# Patient Record
Sex: MALE | Race: WHITE | NOT HISPANIC OR LATINO | URBAN - METROPOLITAN AREA
[De-identification: names, ages, dates, MRNs, and addresses within clinical notes are randomized per-mention and may not be internally consistent; named-entity substitution may affect disease eponyms.]

---

## 2017-12-04 ENCOUNTER — COMMUNICATION - HEALTHEAST (OUTPATIENT)
Dept: INTERNAL MEDICINE | Facility: CLINIC | Age: 37
End: 2017-12-04

## 2017-12-05 ENCOUNTER — OFFICE VISIT - HEALTHEAST (OUTPATIENT)
Dept: INTERNAL MEDICINE | Facility: CLINIC | Age: 37
End: 2017-12-05

## 2017-12-05 DIAGNOSIS — R31.29 MICROSCOPIC HEMATURIA: ICD-10-CM

## 2017-12-05 DIAGNOSIS — Z00.00 ROUTINE GENERAL MEDICAL EXAMINATION AT A HEALTH CARE FACILITY: ICD-10-CM

## 2017-12-05 LAB
CHOLEST SERPL-MCNC: 264 MG/DL
FASTING STATUS PATIENT QL REPORTED: YES
HDLC SERPL-MCNC: 118 MG/DL
LDLC SERPL CALC-MCNC: 138 MG/DL
TRIGL SERPL-MCNC: 40 MG/DL

## 2017-12-05 ASSESSMENT — MIFFLIN-ST. JEOR: SCORE: 1593.04

## 2017-12-06 ENCOUNTER — COMMUNICATION - HEALTHEAST (OUTPATIENT)
Dept: INTERNAL MEDICINE | Facility: CLINIC | Age: 37
End: 2017-12-06

## 2018-04-25 ENCOUNTER — COMMUNICATION - HEALTHEAST (OUTPATIENT)
Dept: INTERNAL MEDICINE | Facility: CLINIC | Age: 38
End: 2018-04-25

## 2018-04-25 RX ORDER — GLYCOPYRROLATE 1 MG/1
TABLET ORAL
Qty: 180 TABLET | Refills: 1 | Status: SHIPPED | OUTPATIENT
Start: 2018-04-25 | End: 2021-07-14

## 2019-10-15 ENCOUNTER — OFFICE VISIT - HEALTHEAST (OUTPATIENT)
Dept: INTERNAL MEDICINE | Facility: CLINIC | Age: 39
End: 2019-10-15

## 2019-10-15 DIAGNOSIS — Z23 NEED FOR PROPHYLACTIC VACCINATION WITH TETANUS-DIPHTHERIA (TD): ICD-10-CM

## 2019-10-15 DIAGNOSIS — Z00.00 ROUTINE GENERAL MEDICAL EXAMINATION AT A HEALTH CARE FACILITY: ICD-10-CM

## 2019-10-15 DIAGNOSIS — R31.29 MICROSCOPIC HEMATURIA: ICD-10-CM

## 2019-10-15 DIAGNOSIS — Z23 IMMUNIZATION DUE: ICD-10-CM

## 2019-10-15 LAB
ALBUMIN SERPL-MCNC: 4.1 G/DL (ref 3.5–5)
ALBUMIN UR-MCNC: ABNORMAL MG/DL
ALP SERPL-CCNC: 64 U/L (ref 45–120)
ALT SERPL W P-5'-P-CCNC: 62 U/L (ref 0–45)
ANION GAP SERPL CALCULATED.3IONS-SCNC: 8 MMOL/L (ref 5–18)
APPEARANCE UR: CLEAR
AST SERPL W P-5'-P-CCNC: 49 U/L (ref 0–40)
BACTERIA #/AREA URNS HPF: ABNORMAL HPF
BILIRUB SERPL-MCNC: 0.6 MG/DL (ref 0–1)
BILIRUB UR QL STRIP: NEGATIVE
BUN SERPL-MCNC: 10 MG/DL (ref 8–22)
CALCIUM SERPL-MCNC: 9.7 MG/DL (ref 8.5–10.5)
CHLORIDE BLD-SCNC: 103 MMOL/L (ref 98–107)
CHOLEST SERPL-MCNC: 240 MG/DL
CO2 SERPL-SCNC: 27 MMOL/L (ref 22–31)
COLOR UR AUTO: YELLOW
CREAT SERPL-MCNC: 0.73 MG/DL (ref 0.7–1.3)
ERYTHROCYTE [DISTWIDTH] IN BLOOD BY AUTOMATED COUNT: 12.1 % (ref 11–14.5)
FASTING STATUS PATIENT QL REPORTED: YES
GFR SERPL CREATININE-BSD FRML MDRD: >60 ML/MIN/1.73M2
GLUCOSE BLD-MCNC: 96 MG/DL (ref 70–125)
GLUCOSE UR STRIP-MCNC: NEGATIVE MG/DL
HCT VFR BLD AUTO: 43.2 % (ref 40–54)
HDLC SERPL-MCNC: 116 MG/DL
HGB BLD-MCNC: 14.7 G/DL (ref 14–18)
HGB UR QL STRIP: ABNORMAL
KETONES UR STRIP-MCNC: NEGATIVE MG/DL
LDLC SERPL CALC-MCNC: 115 MG/DL
LEUKOCYTE ESTERASE UR QL STRIP: NEGATIVE
MCH RBC QN AUTO: 31.7 PG (ref 27–34)
MCHC RBC AUTO-ENTMCNC: 34 G/DL (ref 32–36)
MCV RBC AUTO: 93 FL (ref 80–100)
MUCOUS THREADS #/AREA URNS LPF: ABNORMAL LPF
NITRATE UR QL: NEGATIVE
PH UR STRIP: 6 [PH] (ref 5–8)
PLATELET # BLD AUTO: 275 THOU/UL (ref 140–440)
PMV BLD AUTO: 8.5 FL (ref 7–10)
POTASSIUM BLD-SCNC: 4.3 MMOL/L (ref 3.5–5)
PROT SERPL-MCNC: 7.1 G/DL (ref 6–8)
RBC # BLD AUTO: 4.62 MILL/UL (ref 4.4–6.2)
RBC #/AREA URNS AUTO: ABNORMAL HPF
SODIUM SERPL-SCNC: 138 MMOL/L (ref 136–145)
SP GR UR STRIP: <=1.005 (ref 1–1.03)
SQUAMOUS #/AREA URNS AUTO: ABNORMAL LPF
TRIGL SERPL-MCNC: 46 MG/DL
UROBILINOGEN UR STRIP-ACNC: ABNORMAL
WBC #/AREA URNS AUTO: ABNORMAL HPF
WBC: 7.5 THOU/UL (ref 4–11)

## 2019-10-15 ASSESSMENT — MIFFLIN-ST. JEOR: SCORE: 1574.9

## 2019-10-22 ENCOUNTER — COMMUNICATION - HEALTHEAST (OUTPATIENT)
Dept: INTERNAL MEDICINE | Facility: CLINIC | Age: 39
End: 2019-10-22

## 2020-10-07 ENCOUNTER — COMMUNICATION - HEALTHEAST (OUTPATIENT)
Dept: INTERNAL MEDICINE | Facility: CLINIC | Age: 40
End: 2020-10-07

## 2020-11-16 ENCOUNTER — HEALTH MAINTENANCE LETTER (OUTPATIENT)
Age: 40
End: 2020-11-16

## 2021-05-31 VITALS — BODY MASS INDEX: 23.34 KG/M2 | HEIGHT: 68 IN | WEIGHT: 154 LBS

## 2021-06-02 NOTE — PROGRESS NOTES
Office Visit - Physical   Jean-Claude Bose   39 y.o.  male    Date of visit: 10/15/2019  Physician: Luis F Montoya MD     Assessment and Plan   1. Routine general medical examination at a health care facility  He lives a active healthy lifestyle.  Labs as below.  He has had his flu shot.  Tetanus booster today.  He has had STI testing for his surrogacy.  - Lipid Cascade  - Comprehensive Metabolic Panel  - Urinalysis-UC if Indicated  - HM2(CBC w/o Differential)    2. Microscopic hematuria  This is been persistent for years since his childhood.  Intensively worked up in the past.  Recheck urinalysis today.  He declines need for further testing.        Return in about 1 year (around 10/15/2020) for Annual physical.     Chief Complaint   Chief Complaint   Patient presents with     Annual Exam     fasting today         Patient Profile   Social History     Patient does not qualify to have social determinant information on file (likely too young).   Social History Narrative     Not on file        Past Medical History   Patient Active Problem List   Diagnosis     Urine Tests Nonspecific Abnormal Findings     Functional Murmur     Microscopic Hematuria       Past Surgical History  He has no past surgical history on file.     History of Present Illness   This 39 y.o. old Jean-Claude comes in today for physical.  He reports been doing very well.  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  Very busy.  He just ran the [employer] and qualified for [employer] last year so will be running [employer].  They have a [id number] named Guerrero.  They are going to have another surrogacy.  Things are going very well for him.  He and  are monogamous.  Parents are doing well.  Father's arthritis is severe.  Sister got .  No other new family history.    Review of Systems: A comprehensive review of systems was negative except as noted.     Medications and Allergies   Current Outpatient Medications   Medication  "Sig Dispense Refill     glycopyrrolate (ROBINUL) 1 mg tablet Take one tablet up to two times daily. 180 tablet 1     No current facility-administered medications for this visit.      Allergies   Allergen Reactions     Sulfa (Sulfonamide Antibiotics)         Family and Social History   No family history on file.     Social History     Tobacco Use     Smoking status: Never Smoker     Smokeless tobacco: Never Used   Substance Use Topics     Alcohol use: Not on file     Drug use: Not on file        Physical Exam   General Appearance:   Pleasant gentleman in no distress.  Looks well.    /84 (Patient Site: Right Arm, Patient Position: Sitting, Cuff Size: Adult Regular)   Pulse 64   Ht 5' 8\" (1.727 m)   Wt 150 lb (68 kg)   SpO2 98%   BMI 22.81 kg/m      EYES: Eyelids, conjunctiva, and sclera were normal. Pupils were normal. Cornea, iris, and lens were normal bilaterally.  HEAD, EARS, NOSE, MOUTH, AND THROAT: Head and face were normal. Hearing was normal to voice and the ears were normal to external exam. Nose appearance was normal and there was no discharge. Oropharynx was normal.  NECK: Neck appearance was normal. There were no neck masses and the thyroid was not enlarged.  RESPIRATORY: Breathing pattern was normal and the chest moved symmetrically.  Percussion/auscultatory percussion was normal.  Lung sounds were normal and there were no abnormal sounds.  CARDIOVASCULAR: Heart rate and rhythm were normal.  S1 and S2 were normal and there were no extra sounds or murmurs. Peripheral pulses in arms and legs were normal.  Jugular venous pressure was normal.  There was no peripheral edema.  GASTROINTESTINAL: The abdomen was normal in contour.  Bowel sounds were present.  Percussion detected no organ enlargement or tenderness.  Palpation detected no tenderness, mass, or enlarged organs.   MUSCULOSKELETAL: Skeletal configuration was normal and muscle mass was normal for age. Joint appearance was overall " normal.  LYMPHATIC: There were no enlarged nodes.  SKIN/HAIR/NAILS: Skin color was normal.  There were no skin lesions.  Hair and nails were normal.  NEUROLOGIC: The patient was alert and oriented to person, place, time, and circumstance. Speech was normal. Cranial nerves were normal. Motor strength was normal for age. The patient was normally coordinated.  PSYCHIATRIC:  Mood and affect were normal and the patient had normal recent and remote memory. The patient's judgment and insight were normal.    ADDITIONAL VITAL SIGNS: None  CHEST WALL/BREASTS: Normal  RECTAL: Deferred  GENITAL/URINARY: Normal     Additional Information        Luis F Montoya MD  Internal Medicine  Contact me at [phone]

## 2021-06-03 VITALS
OXYGEN SATURATION: 98 % | WEIGHT: 150 LBS | BODY MASS INDEX: 22.73 KG/M2 | HEART RATE: 64 BPM | DIASTOLIC BLOOD PRESSURE: 84 MMHG | SYSTOLIC BLOOD PRESSURE: 120 MMHG | HEIGHT: 68 IN

## 2021-06-14 NOTE — PROGRESS NOTES
Office Visit - Physical   Jean-Claude Bose   37 y.o.  male    Date of visit: 12/5/2017  Physician: Luis F Montoya MD     Assessment and Plan   1. Routine general medical examination at a health care facility  Lives a very healthy lifestyle.  We discussed overuse injuries.  He will be due for tetanus next year.  Labs as below.  He declines STD testing.  - Comprehensive Metabolic Panel  - Lipid Cascade  - HM2(CBC w/o Differential)  - Urinalysis-UC if Indicated  - Vitamin D, Total (25-Hydroxy)    2. Microscopic hematuria  He has had this microscopic hematuria since a child.  Evaluations have been negative in the past.  Urology did want him to follow-up but he has not done such.  He will consider that this year.  - HM2(CBC w/o Differential)  - Urinalysis-UC if Indicated        Return in about 1 year (around 12/5/2018) for Annual physical.     Chief Complaint   Chief Complaint   Patient presents with     Annual Exam     fasting        Patient Profile   Social History     Social History Narrative        Past Medical History   Patient Active Problem List   Diagnosis     Urine Tests Nonspecific Abnormal Findings     Functional Murmur     Microscopic Hematuria       Past Surgical History  He has no past surgical history on file.     History of Present Illness   This 37 y.o. 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  He reports his been feeling well.  He did have a stress fracture of his tibia last year but that has healed and he is back to doing his triathlons.  Trains twice a day during the summer months.  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  Is going well.  Plan for a child via surrogate next year.  Family is doing well.  Parents are alive and well.  Father does have significant DJD that has him fairly impaired with mobility at only the age of 70.  Otherwise health is good.  Social drinker.  Eats healthy.  Non-smoker.  No illicit drugs.      Review of Systems: A  "comprehensive review of systems was negative except as noted.     Medications and Allergies   Current Outpatient Prescriptions   Medication Sig Dispense Refill     glycopyrrolate (ROBINUL) 1 mg tablet Take one tablet up to two times daily. (Patient taking differently: Take one tablet prn for excessive sweating) 180 tablet 1     No current facility-administered medications for this visit.      Allergies   Allergen Reactions     Sulfa (Sulfonamide Antibiotics)         Family and Social History   No family history on file.     Social History   Substance Use Topics     Smoking status: Never Smoker     Smokeless tobacco: None     Alcohol use None        Physical Exam   General Appearance:   Thin pleasant young gentleman in no distress.    /80 (Patient Site: Right Arm, Patient Position: Sitting, Cuff Size: Adult Regular)  Pulse 60  Ht 5' 8\" (1.727 m)  Wt 154 lb (69.9 kg)  SpO2 98%  BMI 23.42 kg/m2    EYES: Eyelids, conjunctiva, and sclera were normal. Pupils were normal. Cornea, iris, and lens were normal bilaterally.  HEAD, EARS, NOSE, MOUTH, AND THROAT: Head and face were normal. Hearing was normal to voice and the ears were normal to external exam. Nose appearance was normal and there was no discharge. Oropharynx was normal.  NECK: Neck appearance was normal. There were no neck masses and the thyroid was not enlarged.  RESPIRATORY: Breathing pattern was normal and the chest moved symmetrically.  Percussion/auscultatory percussion was normal.  Lung sounds were normal and there were no abnormal sounds.  CARDIOVASCULAR: Heart rate and rhythm were normal.  S1 and S2 were normal and there were no extra sounds or murmurs. Peripheral pulses in arms and legs were normal.  Jugular venous pressure was normal.  There was no peripheral edema.  GASTROINTESTINAL: The abdomen was normal in contour.  Bowel sounds were present.  Percussion detected no organ enlargement or tenderness.  Palpation detected no tenderness, mass, " or enlarged organs.   MUSCULOSKELETAL: Skeletal configuration was normal and muscle mass was normal for age. Joint appearance was overall normal.  LYMPHATIC: There were no enlarged nodes.  SKIN/HAIR/NAILS: Skin color was normal.  There were no skin lesions.  Hair and nails were normal.  NEUROLOGIC: The patient was alert and oriented to person, place, time, and circumstance. Speech was normal. Cranial nerves were normal. Motor strength was normal for age. The patient was normally coordinated.  PSYCHIATRIC:  Mood and affect were normal and the patient had normal recent and remote memory. The patient's judgment and insight were normal.    ADDITIONAL VITAL SIGNS: none  CHEST WALL/BREASTS: nml    RECTAL: def  GENITAL/URINARY: nml     Additional Information        Luis F Montoya MD  Internal Medicine  Contact me at [phone]

## 2021-06-19 NOTE — LETTER
Letter by Luis F Montoya MD at      Author: Luis F Montoya MD Service: -- Author Type: --    Filed:  Encounter Date: 10/22/2019 Status: Signed         Jean-Claude Bose  [address]  [facility] [address]             October 22, 2019         Dear Mr. Bose,    Below are the results from your recent visit:    Resulted Orders   Lipid Cascade   Result Value Ref Range    Cholesterol 240 (H) <=199 mg/dL    Triglycerides 46 <=149 mg/dL    HDL Cholesterol 116 >=40 mg/dL    LDL Calculated 115 <=129 mg/dL    Patient Fasting > 8hrs? Yes    Comprehensive Metabolic Panel   Result Value Ref Range    Sodium 138 136 - 145 mmol/L    Potassium 4.3 3.5 - 5.0 mmol/L    Chloride 103 98 - 107 mmol/L    CO2 27 22 - 31 mmol/L    Anion Gap, Calculation 8 5 - 18 mmol/L    Glucose 96 70 - 125 mg/dL    BUN 10 8 - 22 mg/dL    Creatinine 0.73 0.70 - 1.30 mg/dL    GFR MDRD Af Amer >60 >60 mL/min/1.73m2    GFR MDRD Non Af Amer >60 >60 mL/min/1.73m2    Bilirubin, Total 0.6 0.0 - 1.0 mg/dL    Calcium 9.7 8.5 - 10.5 mg/dL    Protein, Total 7.1 6.0 - 8.0 g/dL    Albumin 4.1 3.5 - 5.0 g/dL    Alkaline Phosphatase 64 45 - 120 U/L    AST 49 (H) 0 - 40 U/L    ALT 62 (H) 0 - 45 U/L    Narrative    Fasting Glucose reference range is 70-99 mg/dL per  American Diabetes Association (ADA) guidelines.   Urinalysis-UC if Indicated   Result Value Ref Range    Color, UA Yellow Colorless, Yellow, Straw, Light Yellow    Clarity, UA Clear Clear    Glucose, UA Negative Negative    Bilirubin, UA Negative Negative    Ketones, UA Negative Negative    Specific Gravity, UA <=1.005 1.005 - 1.030    Blood, UA Small (!) Negative    pH, UA 6.0 5.0 - 8.0    Protein, UA Trace (!) Negative mg/dL    Urobilinogen, UA 0.2 E.U./dL 0.2 E.U./dL, 1.0 E.U./dL    Nitrite, UA Negative Negative    Leukocytes, UA Negative Negative    Bacteria, UA None Seen None Seen hpf    RBC, UA 0-2 None Seen, 0-2 hpf    WBC, UA None Seen None Seen, 0-5 hpf    Squam Epithel, UA None Seen None Seen,  0-5 lpf    Mucus, UA Few (!) None Seen lpf    Narrative    UC not indicated   HM2(CBC w/o Differential)   Result Value Ref Range    WBC 7.5 4.0 - 11.0 thou/uL    RBC 4.62 4.40 - 6.20 mill/uL    Hemoglobin 14.7 14.0 - 18.0 g/dL    Hematocrit 43.2 40.0 - 54.0 %    MCV 93 80 - 100 fL    MCH 31.7 27.0 - 34.0 pg    MCHC 34.0 32.0 - 36.0 g/dL    RDW 12.1 11.0 - 14.5 %    Platelets 275 140 - 440 thou/uL    MPV 8.5 7.0 - 10.0 fL       Cholesterol is excellent, once again.  Mildly elevated liver enzymes likely due to your recent marathon (these enzymes are also in muscle).  Blood counts are normal.  Urine is stable.     Please call with questions or contact us using [employer].    Sincerely,        Electronically signed by Luis F Montoya MD

## 2021-07-03 NOTE — ADDENDUM NOTE
Addendum Note by Alissa Lima MA at 10/15/2019  8:00 AM     Author: Alissa Lima MA Service: -- Author Type: Medical Assistant    Filed: 10/15/2019 11:20 AM Encounter Date: 10/15/2019 Status: Signed    : Alissa Lima MA (Medical Assistant)    Addended by: ALISSA LIMA on: 10/15/2019 11:20 AM        Modules accepted: Orders

## 2021-07-14 ENCOUNTER — OFFICE VISIT (OUTPATIENT)
Dept: INTERNAL MEDICINE | Facility: CLINIC | Age: 41
End: 2021-07-14
Payer: COMMERCIAL

## 2021-07-14 VITALS
HEART RATE: 78 BPM | DIASTOLIC BLOOD PRESSURE: 70 MMHG | HEIGHT: 68 IN | SYSTOLIC BLOOD PRESSURE: 114 MMHG | WEIGHT: 149 LBS | OXYGEN SATURATION: 96 % | TEMPERATURE: 97.8 F | BODY MASS INDEX: 22.58 KG/M2

## 2021-07-14 DIAGNOSIS — R31.29 MICROSCOPIC HEMATURIA: ICD-10-CM

## 2021-07-14 DIAGNOSIS — R61 EXCESSIVE SWEATING: ICD-10-CM

## 2021-07-14 DIAGNOSIS — Z11.59 NEED FOR HEPATITIS C SCREENING TEST: ICD-10-CM

## 2021-07-14 DIAGNOSIS — Z00.00 ROUTINE GENERAL MEDICAL EXAMINATION AT A HEALTH CARE FACILITY: Primary | ICD-10-CM

## 2021-07-14 LAB
ALBUMIN SERPL-MCNC: 4.5 G/DL (ref 3.5–5)
ALBUMIN UR-MCNC: NEGATIVE MG/DL
ALP SERPL-CCNC: 69 U/L (ref 45–120)
ALT SERPL W P-5'-P-CCNC: 27 U/L (ref 0–45)
ANION GAP SERPL CALCULATED.3IONS-SCNC: 15 MMOL/L (ref 5–18)
APPEARANCE UR: CLEAR
AST SERPL W P-5'-P-CCNC: 32 U/L (ref 0–40)
BACTERIA #/AREA URNS HPF: NORMAL /HPF
BILIRUB SERPL-MCNC: 0.9 MG/DL (ref 0–1)
BILIRUB UR QL STRIP: NEGATIVE
BUN SERPL-MCNC: 13 MG/DL (ref 8–22)
CALCIUM SERPL-MCNC: 9.7 MG/DL (ref 8.5–10.5)
CHLORIDE BLD-SCNC: 101 MMOL/L (ref 98–107)
CHOLEST SERPL-MCNC: 298 MG/DL
CO2 SERPL-SCNC: 23 MMOL/L (ref 22–31)
COLOR UR AUTO: YELLOW
CREAT SERPL-MCNC: 0.72 MG/DL (ref 0.7–1.3)
ERYTHROCYTE [DISTWIDTH] IN BLOOD BY AUTOMATED COUNT: 12 % (ref 10–15)
FASTING STATUS PATIENT QL REPORTED: NO
GFR SERPL CREATININE-BSD FRML MDRD: >90 ML/MIN/1.73M2
GLUCOSE BLD-MCNC: 75 MG/DL (ref 70–125)
GLUCOSE UR STRIP-MCNC: NEGATIVE MG/DL
HCT VFR BLD AUTO: 43.2 % (ref 40–53)
HDLC SERPL-MCNC: 143 MG/DL
HGB BLD-MCNC: 14.8 G/DL (ref 13.3–17.7)
HGB UR QL STRIP: ABNORMAL
KETONES UR STRIP-MCNC: NEGATIVE MG/DL
LDLC SERPL CALC-MCNC: 145 MG/DL
LEUKOCYTE ESTERASE UR QL STRIP: NEGATIVE
MCH RBC QN AUTO: 32.6 PG (ref 26.5–33)
MCHC RBC AUTO-ENTMCNC: 34.3 G/DL (ref 31.5–36.5)
MCV RBC AUTO: 95 FL (ref 78–100)
NITRATE UR QL: NEGATIVE
PH UR STRIP: 7 [PH] (ref 5–8)
PLATELET # BLD AUTO: 212 10E3/UL (ref 150–450)
POTASSIUM BLD-SCNC: 4.6 MMOL/L (ref 3.5–5)
PROT SERPL-MCNC: 7.3 G/DL (ref 6–8)
RBC # BLD AUTO: 4.54 10E6/UL (ref 4.4–5.9)
RBC #/AREA URNS AUTO: NORMAL /HPF
SODIUM SERPL-SCNC: 139 MMOL/L (ref 136–145)
SP GR UR STRIP: 1.01 (ref 1–1.03)
SQUAMOUS #/AREA URNS AUTO: NORMAL /LPF
TRIGL SERPL-MCNC: 48 MG/DL
UROBILINOGEN UR STRIP-ACNC: 0.2 E.U./DL
WBC # BLD AUTO: 5.7 10E3/UL (ref 4–11)
WBC #/AREA URNS AUTO: NORMAL /HPF

## 2021-07-14 PROCEDURE — 85027 COMPLETE CBC AUTOMATED: CPT | Performed by: INTERNAL MEDICINE

## 2021-07-14 PROCEDURE — 80053 COMPREHEN METABOLIC PANEL: CPT | Performed by: INTERNAL MEDICINE

## 2021-07-14 PROCEDURE — 80061 LIPID PANEL: CPT | Performed by: INTERNAL MEDICINE

## 2021-07-14 PROCEDURE — 36415 COLL VENOUS BLD VENIPUNCTURE: CPT | Performed by: INTERNAL MEDICINE

## 2021-07-14 PROCEDURE — 99395 PREV VISIT EST AGE 18-39: CPT | Performed by: INTERNAL MEDICINE

## 2021-07-14 PROCEDURE — 81001 URINALYSIS AUTO W/SCOPE: CPT | Performed by: INTERNAL MEDICINE

## 2021-07-14 PROCEDURE — 86803 HEPATITIS C AB TEST: CPT | Performed by: INTERNAL MEDICINE

## 2021-07-14 RX ORDER — GLYCOPYRROLATE 1 MG/1
TABLET ORAL
Qty: 180 TABLET | Refills: 1 | Status: SHIPPED | OUTPATIENT
Start: 2021-07-14 | End: 2023-03-02

## 2021-07-14 ASSESSMENT — MIFFLIN-ST. JEOR: SCORE: 1565.36

## 2021-07-14 NOTE — PROGRESS NOTES
SUBJECTIVE:   CC: Jean-Claude Bose is an 41 year old male who presents for preventative health visit.       Patient has been advised of split billing requirements and indicates understanding: Yes  Healthy Habits:     Getting at least 3 servings of Calcium per day:  Yes    Bi-annual eye exam:  NO    Dental care twice a year:  Yes    Sleep apnea or symptoms of sleep apnea:  None    Diet:  Low fat/cholesterol    Frequency of exercise:  6-7 days/week    Duration of exercise:  30-45 minutes    Taking medications regularly:  Yes    Medication side effects:  Not applicable    PHQ-2 Total Score: 0    Additional concerns today:  No    Jean-Claude comes in today for physical. He reports he is feeling well. Physically and emotionally things are good although he has some new things to report. His father was diagnosed with pulmonary fibrosis a couple years ago and passed away in January of this year. This was likely due to his RA or his RA medications. Mother is doing okay. He also lost a brother to muscular dystrophy. His sister is doing well. She has 1 child. Patient himself had a second child, I born in [place]. He is 13 months old. Older brother Guerrero is 2-1/2 years old. 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 Work is going well for him. He remains very active with exercise. Runs marathons etc.          Today's PHQ-2 Score:   PHQ-2 ( 1999 [employer]) 7/14/2021   Q1: Little interest or pleasure in doing things 0   Q2: Feeling down, depressed or hopeless 0   PHQ-2 Score 0   Q1: Little interest or pleasure in doing things Not at all   Q2: Feeling down, depressed or hopeless Not at all   PHQ-2 Score 0       Abuse: Current or Past(Physical, Sexual or Emotional)- No  Do you feel safe in your environment? Yes    Have you ever done Advance Care Planning? (For example, a Health Directive, POLST, or a discussion with a medical provider or your loved ones about your wishes): Yes, patient states has an  "Advance Care Planning document and will bring a copy to the clinic.    Social History     Tobacco Use     Smoking status: Never Smoker     Smokeless tobacco: Never Used   Substance Use Topics     Alcohol use: Not on file         Alcohol Use 7/14/2021   Prescreen: >3 drinks/day or >7 drinks/week? No       Last PSA:   Prostate Specific Antigen Screen   Date Value Ref Range Status   11/10/2009 0.82 <4.01 ng/mL Final       Reviewed orders with patient. Reviewed health maintenance and updated orders accordingly - Yes      Reviewed and updated as needed this visit by clinical staff  Tobacco  Allergies  Meds              Reviewed and updated as needed this visit by Provider                    Review of Systems  CONSTITUTIONAL: NEGATIVE for fever, chills, change in weight  INTEGUMENTARY/SKIN: NEGATIVE for worrisome rashes, moles or lesions  EYES: NEGATIVE for vision changes or irritation  ENT: NEGATIVE for ear, mouth and throat problems  RESP: NEGATIVE for significant cough or SOB  CV: NEGATIVE for chest pain, palpitations or peripheral edema  GI: NEGATIVE for nausea, abdominal pain, heartburn, or change in bowel habits   male: negative for dysuria, hematuria, decreased urinary stream, erectile dysfunction, urethral discharge  MUSCULOSKELETAL: NEGATIVE for significant arthralgias or myalgia  NEURO: NEGATIVE for weakness, dizziness or paresthesias  PSYCHIATRIC: NEGATIVE for changes in mood or affect    OBJECTIVE:   Ht 1.727 m (5' 8\")   Wt 67.6 kg (149 lb)   BMI 22.66 kg/m      Physical Exam  GENERAL: healthy, alert and no distress  EYES: Eyes grossly normal to inspection, PERRL and conjunctivae and sclerae normal  HENT: ear canals and TM's normal, nose and mouth without ulcers or lesions  NECK: no adenopathy, no asymmetry, masses, or scars and thyroid normal to palpation  RESP: lungs clear to auscultation - no rales, rhonchi or wheezes  CV: regular rate and rhythm, normal S1 S2, no S3 or S4, no murmur, click or rub, " "no peripheral edema and peripheral pulses strong  ABDOMEN: soft, nontender, no hepatosplenomegaly, no masses and bowel sounds normal   (male): normal male genitalia without lesions or urethral discharge, no hernia  MS: no gross musculoskeletal defects noted, no edema  SKIN: no suspicious lesions or rashes  NEURO: Normal strength and tone, mentation intact and speech normal  PSYCH: mentation appears normal, affect normal/bright    Diagnostic Test Results:  Labs reviewed in Epic    ASSESSMENT/PLAN:   1. Routine general medical examination at a health care facility  Lives an active and healthy lifestyle he will continue same.  - Lipid panel reflex to direct LDL Fasting; Future  - UA Macro with Reflex to Micro and Culture - lab collect; Future  - CBC with platelets; Future  - Comprehensive metabolic panel (BMP + Alb, Alk Phos, ALT, AST, Total. Bili, TP); Future    2. Microscopic hematuria  This is been extensively evaluated in the past. Felt to be familial. Does not wish any further evaluation.    3. Excessive sweating  Good control with the robinul. Continue same.  - glycopyrrolate (ROBINUL) 1 MG tablet; [GLYCOPYRROLATE (ROBINUL) 1 MG TABLET] Take one tablet up to two times daily.  Dispense: 180 tablet; Refill: 1    4. Need for hepatitis C screening test    - Hepatitis C Screen Reflex to HCV RNA Quant and Genotype; Future    Patient has been advised of split billing requirements and indicates understanding: Yes  COUNSELING:   Reviewed preventive health counseling, as reflected in patient instructions    Estimated body mass index is 22.66 kg/m  as calculated from the following:    Height as of this encounter: 1.727 m (5' 8\").    Weight as of this encounter: 67.6 kg (149 lb).         He reports that he has never smoked. He has never used smokeless tobacco.      Counseling Resources:  ATP IV Guidelines  Pooled Cohorts Equation Calculator  FRAX Risk Assessment  ICSI Preventive Guidelines  Dietary Guidelines for " Americans, 2010  USDA's MyPlate  ASA Prophylaxis  Lung CA Screening    JOSIE POTTER MD  [facility]

## 2021-07-14 NOTE — LETTER
July 16, 2021      Jean-Claude Bose  [address] [place] [address]        Dear ,    We are writing to inform you of your test results.    Jean-Claude your labs all look very good.  Your cholesterol is up, but so is your good Cholesterol, the HDL.  In fact, your HDL is probably the highest I have ever seen!  That is very protective.         Resulted Orders   Hepatitis C Screen Reflex to HCV RNA Quant and Genotype   Result Value Ref Range    Hepatitis C Antibody Nonreactive Nonreactive    Narrative    Assay performance characteristics have not been established for newborns, infants, and children.   Lipid panel reflex to direct LDL Fasting   Result Value Ref Range    Cholesterol 298 (H) <=199 mg/dL    Triglycerides 48 <=149 mg/dL    Direct Measure  >=40 mg/dL      Comment:      HDL Cholesterol Reference Range:     0-2 years:   No reference ranges established for patients under 2 years old  at [facility] for lipid analytes.    2-8 years:  Greater than 45 mg/dL     18 years and older:   Female: Greater than or equal to 50 mg/dL   Male:   Greater than or equal to 40 mg/dL    LDL Cholesterol Calculated 145 (H) <=129 mg/dL    Patient Fasting > 8hrs? No    UA Macro with Reflex to Micro and Culture - lab collect   Result Value Ref Range    Color Urine Yellow Colorless, Straw, Light Yellow, Yellow    Appearance Urine Clear Clear    Glucose Urine Negative Negative mg/dL    Bilirubin Urine Negative Negative    Ketones Urine Negative Negative mg/dL    Specific Gravity Urine 1.015 1.005 - 1.030    Blood Urine Small (A) Negative    pH Urine 7.0 5.0 - 8.0    Protein Albumin Urine Negative Negative mg/dL    Urobilinogen Urine 0.2 0.2, 1.0 E.U./dL    Nitrite Urine Negative Negative    Leukocyte Esterase Urine Negative Negative   CBC with platelets   Result Value Ref Range    WBC Count 5.7 4.0 - 11.0 10e3/uL    RBC Count 4.54 4.40 - 5.90 10e6/uL    Hemoglobin 14.8 13.3 - 17.7 g/dL     Hematocrit 43.2 40.0 - 53.0 %    MCV 95 78 - 100 fL    MCH 32.6 26.5 - 33.0 pg    MCHC 34.3 31.5 - 36.5 g/dL    RDW 12.0 10.0 - 15.0 %    Platelet Count 212 150 - 450 10e3/uL   Comprehensive metabolic panel (BMP + Alb, Alk Phos, ALT, AST, Total. Bili, TP)   Result Value Ref Range    Sodium 139 136 - 145 mmol/L    Potassium 4.6 3.5 - 5.0 mmol/L    Chloride 101 98 - 107 mmol/L    Carbon Dioxide (CO2) 23 22 - 31 mmol/L    Anion Gap 15 5 - 18 mmol/L    Urea Nitrogen 13 8 - 22 mg/dL    Creatinine 0.72 0.70 - 1.30 mg/dL    Calcium 9.7 8.5 - 10.5 mg/dL    Glucose 75 70 - 125 mg/dL    Alkaline Phosphatase 69 45 - 120 U/L    AST 32 0 - 40 U/L    ALT 27 0 - 45 U/L    Protein Total 7.3 6.0 - 8.0 g/dL    Albumin 4.5 3.5 - 5.0 g/dL    Bilirubin Total 0.9 0.0 - 1.0 mg/dL    GFR Estimate >90 >60 mL/min/1.73m2      Comment:      As of July 11, 2021, eGFR is calculated by the CKD-EPI creatinine equation, without race adjustment. eGFR can be influenced by muscle mass, exercise, and diet. The reported eGFR is an estimation only and is only applicable if the renal function is stable.   Urine Microscopic   Result Value Ref Range    Bacteria Urine None Seen None Seen /HPF    RBC Urine None Seen 0-2 /HPF /HPF    WBC Urine None Seen 0-5 /HPF /HPF    Squamous Epithelials Urine None Seen None Seen /LPF    Narrative    Urine Culture not indicated       If you have any questions or concerns, please call the clinic at the number listed above.       Sincerely,      Luis F Montoya MD

## 2021-07-15 LAB — HCV AB SERPL QL IA: NONREACTIVE

## 2021-09-12 ENCOUNTER — HEALTH MAINTENANCE LETTER (OUTPATIENT)
Age: 41
End: 2021-09-12

## 2022-04-01 ENCOUNTER — TRANSFERRED RECORDS (OUTPATIENT)
Dept: HEALTH INFORMATION MANAGEMENT | Facility: CLINIC | Age: 42
End: 2022-04-01
Payer: COMMERCIAL

## 2022-04-18 ENCOUNTER — TRANSFERRED RECORDS (OUTPATIENT)
Dept: HEALTH INFORMATION MANAGEMENT | Facility: CLINIC | Age: 42
End: 2022-04-18
Payer: COMMERCIAL

## 2022-04-25 ENCOUNTER — OFFICE VISIT (OUTPATIENT)
Dept: INTERNAL MEDICINE | Facility: CLINIC | Age: 42
End: 2022-04-25
Payer: COMMERCIAL

## 2022-04-25 VITALS
HEART RATE: 63 BPM | TEMPERATURE: 97.8 F | WEIGHT: 149.5 LBS | HEIGHT: 68 IN | DIASTOLIC BLOOD PRESSURE: 72 MMHG | OXYGEN SATURATION: 98 % | BODY MASS INDEX: 22.66 KG/M2 | SYSTOLIC BLOOD PRESSURE: 126 MMHG | RESPIRATION RATE: 16 BRPM

## 2022-04-25 DIAGNOSIS — S83.241A TEAR OF MEDIAL MENISCUS OF RIGHT KNEE, CURRENT, UNSPECIFIED TEAR TYPE, INITIAL ENCOUNTER: ICD-10-CM

## 2022-04-25 DIAGNOSIS — Z01.818 PREOPERATIVE EXAMINATION: Primary | ICD-10-CM

## 2022-04-25 PROCEDURE — 99213 OFFICE O/P EST LOW 20 MIN: CPT | Performed by: INTERNAL MEDICINE

## 2022-04-25 NOTE — PROGRESS NOTES
[facility]  [facility]    Care coordination - Customized care -  Comprehensive and complex medical care            Date of Service: 4/25/2022  Primary Provider: Luis F Montoya    Patient Care Team:  Luis F Montoya MD as PCP - General (Internal Medicine)  Luis F Montoya MD as Assigned PCP           Patient's Pharmacy:    [id number] - [place], [place] - [address]  [address]  [place] [address]  Phone: [phone] Fax: [phone]     Patient's Contacts:  Name Home Phone Work Phone Mobile Phone Relationship Lgl DARSHANA Kitchen [phone] Significant*      Patient's Insurance:    Payor: [employer] / Plan: [employer] / Product Type: HMO /           Preoperative examination    Jean-Claude Bose is a 41 year old male (1980) who I am asked to see by his surgeon regarding his fitness for upcoming surgery.      Chief Complaint   Patient presents with     Pre-Op Exam     4/28/22, [facility], right knee torn meniscus      Subjective:     Patient comes in today for preoperative evaluation prior to his planned arthroscopy and meniscus surgery at [facility] in [place]    He has been having some issue with his knee since January but particularly bad in the last month.  He has been following up with [facility] related to this.  He is a runner normally.  No obvious known injury.  There is apparently a medial meniscus tear anteriorly.  He has seen Dr. Huerta in clinic and will be having surgery with him.    He is in otherwise good health.  He has had a bad reaction to Percocet with ankle surgery in the past.  This made him delusional.    ______________________________________________________________________     Pre-op Questionnaire 4/25/2022   1. Have you ever had a heart attack or stroke? No   2. Have you ever had surgery on your heart or blood vessels, such as a stent placement, a coronary artery bypass, or  surgery on an artery in your head, neck, heart, or legs? No   3. Do you have chest pain with activity? No   4. Do you have a history of  heart failure? No   5. Do you currently have a cold, bronchitis or symptoms of other infection? No   6. Do you have a cough, shortness of breath, or wheezing? No   7. Do you or anyone in your family have previous history of blood clots? No   8. Do you or does anyone in your family have a serious bleeding problem such as prolonged bleeding following surgeries or cuts? No   9. Have you ever had problems with anemia or been told to take iron pills? No   10. Have you had any abnormal blood loss such as black, tarry or bloody stools? No   11. Have you ever had a blood transfusion? No   12. Are you willing to have a blood transfusion if it is medically needed before, during, or after your surgery? Yes   13. Have you or any of your relatives ever had problems with anesthesia? No   14. Do you have sleep apnea, excessive snoring or daytime drowsiness? No   15. Do you have any artifical heart valves or other implanted medical devices like a pacemaker, defibrillator, or continuous glucose monitor? No   16. Do you have artificial joints? No   17. Are you allergic to latex? No     ______________________________________________________________________     We reviewed his other issues noted in the assessment but not specifically addressed in the HPI above.           Patient Active Problem List   Diagnosis     Urine Tests Nonspecific Abnormal Findings     Functional Murmur     Microscopic Hematuria       No past surgical history on file.   Social History     Occupational History     Not on file   Tobacco Use     Smoking status: Never Smoker     Smokeless tobacco: Never Used   Substance and Sexual Activity     Alcohol use: Not on file     Drug use: Not on file     Sexual activity: Not on file      Social History     Social History Narrative     Not on file      No family history on file.  Family  "history is noncontributory otherwise.    Allergies: Sulfa drugs     Current medications:  Current Outpatient Medications   Medication Instructions     glycopyrrolate (ROBINUL) 1 MG tablet [GLYCOPYRROLATE (ROBINUL) 1 MG TABLET] Take one tablet up to two times daily.        Objective:     Wt Readings from Last 3 Encounters:   04/25/22 67.8 kg (149 lb 8 oz)   07/14/21 67.6 kg (149 lb)   10/15/19 68 kg (150 lb)     BP Readings from Last 3 Encounters:   04/25/22 126/72   07/14/21 114/70   10/15/19 120/84     /72   Pulse 63   Temp 97.8  F (36.6  C) (Oral)   Resp 16   Ht 1.721 m (5' 7.75\")   Wt 67.8 kg (149 lb 8 oz)   SpO2 98%   BMI 22.90 kg/m     Patient is in no acute distress.  Mood good.  Insight good.  Eyes nonicteric.  Pupils equal.  Ears clear.  Neck is supple.  No cervical adenopathy.  No thyromegaly.  Heart is regular rate and rhythm.  Lungs clear to auscultation bilaterally.  Respiratory effort is good.  Abdomen is soft, nontender, no hepatosplenomegaly.  Extremities no edema.       Diagnostics:     Office Visit on 07/14/2021   Component Date Value Ref Range Status     Hepatitis C Antibody 07/14/2021 Nonreactive  Nonreactive Final     Cholesterol 07/14/2021 298 (A) <=199 mg/dL Final     Triglycerides 07/14/2021 48  <=149 mg/dL Final     Direct Measure HDL 07/14/2021 143  >=40 mg/dL Final    HDL Cholesterol Reference Range:     0-2 years:   No reference ranges established for patients under 2 years old  at [facility] for lipid analytes.    2-8 years:  Greater than 45 mg/dL     18 years and older:   Female: Greater than or equal to 50 mg/dL   Male:   Greater than or equal to 40 mg/dL     LDL Cholesterol Calculated 07/14/2021 145 (A) <=129 mg/dL Final     Patient Fasting > 8hrs? 07/14/2021 No   Final     Color Urine 07/14/2021 Yellow  Colorless, Straw, Light Yellow, Yellow Final     Appearance Urine 07/14/2021 Clear  Clear Final     Glucose Urine 07/14/2021 Negative  Negative mg/dL Final "     Bilirubin Urine 07/14/2021 Negative  Negative Final     Ketones Urine 07/14/2021 Negative  Negative mg/dL Final     Specific Gravity Urine 07/14/2021 1.015  1.005 - 1.030 Final     Blood Urine 07/14/2021 Small (A) Negative Final     pH Urine 07/14/2021 7.0  5.0 - 8.0 Final     Protein Albumin Urine 07/14/2021 Negative  Negative mg/dL Final     Urobilinogen Urine 07/14/2021 0.2  0.2, 1.0 E.U./dL Final     Nitrite Urine 07/14/2021 Negative  Negative Final     Leukocyte Esterase Urine 07/14/2021 Negative  Negative Final     WBC Count 07/14/2021 5.7  4.0 - 11.0 10e3/uL Final     RBC Count 07/14/2021 4.54  4.40 - 5.90 10e6/uL Final     Hemoglobin 07/14/2021 14.8  13.3 - 17.7 g/dL Final     Hematocrit 07/14/2021 43.2  40.0 - 53.0 % Final     MCV 07/14/2021 95  78 - 100 fL Final     MCH 07/14/2021 32.6  26.5 - 33.0 pg Final     MCHC 07/14/2021 34.3  31.5 - 36.5 g/dL Final     RDW 07/14/2021 12.0  10.0 - 15.0 % Final     Platelet Count 07/14/2021 212  150 - 450 10e3/uL Final     Sodium 07/14/2021 139  136 - 145 mmol/L Final     Potassium 07/14/2021 4.6  3.5 - 5.0 mmol/L Final     Chloride 07/14/2021 101  98 - 107 mmol/L Final     Carbon Dioxide (CO2) 07/14/2021 23  22 - 31 mmol/L Final     Anion Gap 07/14/2021 15  5 - 18 mmol/L Final     Urea Nitrogen 07/14/2021 13  8 - 22 mg/dL Final     Creatinine 07/14/2021 0.72  0.70 - 1.30 mg/dL Final     Calcium 07/14/2021 9.7  8.5 - 10.5 mg/dL Final     Glucose 07/14/2021 75  70 - 125 mg/dL Final     Alkaline Phosphatase 07/14/2021 69  45 - 120 U/L Final     AST 07/14/2021 32  0 - 40 U/L Final     ALT 07/14/2021 27  0 - 45 U/L Final     Protein Total 07/14/2021 7.3  6.0 - 8.0 g/dL Final     Albumin 07/14/2021 4.5  3.5 - 5.0 g/dL Final     Bilirubin Total 07/14/2021 0.9  0.0 - 1.0 mg/dL Final     GFR Estimate 07/14/2021 >90  >60 mL/min/1.73m2 Final    As of July 11, 2021, eGFR is calculated by the CKD-EPI creatinine equation, without race adjustment. eGFR can be influenced by  muscle mass, exercise, and diet. The reported eGFR is an estimation only and is only applicable if the renal function is stable.     Bacteria Urine 07/14/2021 None Seen  None Seen /HPF Final     RBC Urine 07/14/2021 None Seen  0-2 /HPF /HPF Final     WBC Urine 07/14/2021 None Seen  0-5 /HPF /HPF Final     Squamous Epithelials Urine 07/14/2021 None Seen  None Seen /LPF Final       No results found for any visits on 04/25/22.    Impression:     1. Preoperative examination    2. Tear of meniscus of right knee as current injury, unspecified meniscus, unspecified tear type, initial encounter        Plan:     1. Okay to proceed with surgery as planned.  2. He had COVID testing at [facility] and this is pending at this time.  3. No additional testing needed for this low risk surgery.  4. Follow-up with Dr. Montoya in the future for physical.           Ronnie Day MD  General Internal Medicine  [facility]    No follow-ups on file.     Future Appointments   Date Time Provider Department Center   4/25/2022 12:30 PM Ronnie Day MD [facility]

## 2022-04-28 ENCOUNTER — TRANSFERRED RECORDS (OUTPATIENT)
Dept: HEALTH INFORMATION MANAGEMENT | Facility: CLINIC | Age: 42
End: 2022-04-28
Payer: COMMERCIAL

## 2022-05-06 ENCOUNTER — TRANSFERRED RECORDS (OUTPATIENT)
Dept: HEALTH INFORMATION MANAGEMENT | Facility: CLINIC | Age: 42
End: 2022-05-06
Payer: COMMERCIAL

## 2022-08-14 ENCOUNTER — HEALTH MAINTENANCE LETTER (OUTPATIENT)
Age: 42
End: 2022-08-14

## 2022-09-09 ENCOUNTER — TRANSFERRED RECORDS (OUTPATIENT)
Dept: HEALTH INFORMATION MANAGEMENT | Facility: CLINIC | Age: 42
End: 2022-09-09

## 2022-12-14 ENCOUNTER — TRANSFERRED RECORDS (OUTPATIENT)
Dept: HEALTH INFORMATION MANAGEMENT | Facility: CLINIC | Age: 42
End: 2022-12-14

## 2022-12-19 ENCOUNTER — OFFICE VISIT (OUTPATIENT)
Dept: INTERNAL MEDICINE | Facility: CLINIC | Age: 42
End: 2022-12-19
Payer: COMMERCIAL

## 2022-12-19 ENCOUNTER — HOSPITAL ENCOUNTER (OUTPATIENT)
Dept: CT IMAGING | Facility: HOSPITAL | Age: 42
Discharge: HOME | End: 2022-12-19
Attending: INTERNAL MEDICINE | Admitting: INTERNAL MEDICINE
Payer: COMMERCIAL

## 2022-12-19 VITALS
HEIGHT: 68 IN | SYSTOLIC BLOOD PRESSURE: 122 MMHG | HEART RATE: 78 BPM | BODY MASS INDEX: 21.52 KG/M2 | DIASTOLIC BLOOD PRESSURE: 78 MMHG | OXYGEN SATURATION: 100 % | TEMPERATURE: 97.8 F | WEIGHT: 142 LBS

## 2022-12-19 DIAGNOSIS — R10.31 RIGHT LOWER QUADRANT PAIN: Primary | ICD-10-CM

## 2022-12-19 DIAGNOSIS — R10.31 RIGHT LOWER QUADRANT PAIN: ICD-10-CM

## 2022-12-19 LAB
ALBUMIN SERPL BCG-MCNC: 4.5 G/DL (ref 3.5–5.2)
ALBUMIN UR-MCNC: NEGATIVE MG/DL
ALP SERPL-CCNC: 72 U/L (ref 40–129)
ALT SERPL W P-5'-P-CCNC: 22 U/L (ref 10–50)
ANION GAP SERPL CALCULATED.3IONS-SCNC: 10 MMOL/L (ref 7–15)
APPEARANCE UR: CLEAR
AST SERPL W P-5'-P-CCNC: 28 U/L (ref 10–50)
BACTERIA #/AREA URNS HPF: NORMAL /HPF
BILIRUB SERPL-MCNC: 0.6 MG/DL
BILIRUB UR QL STRIP: NEGATIVE
BUN SERPL-MCNC: 12.4 MG/DL (ref 6–20)
CALCIUM SERPL-MCNC: 9.8 MG/DL (ref 8.6–10)
CHLORIDE SERPL-SCNC: 99 MMOL/L (ref 98–107)
COLOR UR AUTO: YELLOW
CREAT SERPL-MCNC: 0.68 MG/DL (ref 0.67–1.17)
DEPRECATED HCO3 PLAS-SCNC: 28 MMOL/L (ref 22–29)
ERYTHROCYTE [DISTWIDTH] IN BLOOD BY AUTOMATED COUNT: 11.7 % (ref 10–15)
GFR SERPL CREATININE-BSD FRML MDRD: >90 ML/MIN/1.73M2
GLUCOSE SERPL-MCNC: 123 MG/DL (ref 70–99)
GLUCOSE UR STRIP-MCNC: NEGATIVE MG/DL
HCT VFR BLD AUTO: 43.9 % (ref 40–53)
HGB BLD-MCNC: 14.9 G/DL (ref 13.3–17.7)
HGB UR QL STRIP: ABNORMAL
KETONES UR STRIP-MCNC: NEGATIVE MG/DL
LEUKOCYTE ESTERASE UR QL STRIP: NEGATIVE
MCH RBC QN AUTO: 32.3 PG (ref 26.5–33)
MCHC RBC AUTO-ENTMCNC: 33.9 G/DL (ref 31.5–36.5)
MCV RBC AUTO: 95 FL (ref 78–100)
NITRATE UR QL: NEGATIVE
PH UR STRIP: 7.5 [PH] (ref 5–8)
PLATELET # BLD AUTO: 283 10E3/UL (ref 150–450)
POTASSIUM SERPL-SCNC: 4.5 MMOL/L (ref 3.4–5.3)
PROT SERPL-MCNC: 7 G/DL (ref 6.4–8.3)
RBC # BLD AUTO: 4.61 10E6/UL (ref 4.4–5.9)
RBC #/AREA URNS AUTO: NORMAL /HPF
SODIUM SERPL-SCNC: 137 MMOL/L (ref 136–145)
SP GR UR STRIP: 1.01 (ref 1–1.03)
SQUAMOUS #/AREA URNS AUTO: NORMAL /LPF
UROBILINOGEN UR STRIP-ACNC: 0.2 E.U./DL
WBC # BLD AUTO: 7.2 10E3/UL (ref 4–11)
WBC #/AREA URNS AUTO: NORMAL /HPF

## 2022-12-19 PROCEDURE — 99213 OFFICE O/P EST LOW 20 MIN: CPT | Performed by: INTERNAL MEDICINE

## 2022-12-19 PROCEDURE — 85027 COMPLETE CBC AUTOMATED: CPT | Performed by: INTERNAL MEDICINE

## 2022-12-19 PROCEDURE — 36415 COLL VENOUS BLD VENIPUNCTURE: CPT | Performed by: INTERNAL MEDICINE

## 2022-12-19 PROCEDURE — 74176 CT ABD & PELVIS W/O CONTRAST: CPT

## 2022-12-19 PROCEDURE — 81001 URINALYSIS AUTO W/SCOPE: CPT | Performed by: INTERNAL MEDICINE

## 2022-12-19 PROCEDURE — 80053 COMPREHEN METABOLIC PANEL: CPT | Performed by: INTERNAL MEDICINE

## 2022-12-19 RX ORDER — NAPROXEN 500 MG/1
500 TABLET ORAL 2 TIMES DAILY WITH MEALS
Qty: 60 TABLET | Refills: 0 | Status: SHIPPED | OUTPATIENT
Start: 2022-12-19 | End: 2023-01-25

## 2022-12-19 NOTE — PROGRESS NOTES
Assessment & Plan     1. Right lower quadrant pain  This persistent for some time.  I still think it is muscular.  Trial of naproxen twice daily.  We will image to make sure there is no underlying inflammation causing his discomfort though.  CT as below.  Lab work as below.  If things do not improving in the next couple weeks with medication therapy he is to let me know.  - CT Abdomen Pelvis w/o Contrast; Future  - naproxen (NAPROSYN) 500 MG tablet; Take 1 tablet (500 mg) by mouth 2 times daily (with meals)  Dispense: 60 tablet; Refill: 0  - Comprehensive metabolic panel (BMP + Alb, Alk Phos, ALT, AST, Total. Bili, TP); Future  - CBC with platelets; Future  - UA Macro with Reflex to Micro and Culture - lab collect; Future                 Return in about 4 weeks (around 1/16/2023) for Next scheduled visit.    JOSIE POTTER MD  [facility]   Jean-Claude is a 42 year old, presenting for the following health issues:  Hernia (Intermittent mild abdominal muscular tension)      History of Present Illness       Reason for visit:  Hernia?  Symptom onset:  3-4 weeks ago  Symptoms include:  Lower abdominal pain  Symptom intensity:  Mild  Symptom progression:  Improving  Had these symptoms before:  No  What makes it worse:  Lifting or reaching    He eats 2-3 servings of fruits and vegetables daily.He consumes 0 sweetened beverage(s) daily.He exercises with enough effort to increase his heart rate 60 or more minutes per day.  He exercises with enough effort to increase his heart rate 7 days per week.   He is taking medications regularly.         Patient about a month or 2 ago's was lifting his kids more than usual because his significant other was out of country.  Around that time he started noticing some discomfort in his right lower quadrant that radiated up into the mid epigastric region.  It hurt with just certain movements.  He did some rest and stopped lifting and things got better  "but then flared again.  Its not going away.  He is not taking anything for it really.  And occasional ibuprofen.  It does not limit him from doing anything he has been no change in bowels or bladder.  It is just not going away and it worries him.    Review of Systems         Objective    /78 (BP Location: Left arm, Patient Position: Sitting, Cuff Size: Adult Regular)   Pulse 78   Temp 97.8  F (36.6  C)   Ht 1.721 m (5' 7.75\")   Wt 64.4 kg (142 lb)   SpO2 100%   BMI 21.75 kg/m    Body mass index is 21.75 kg/m .  Physical Exam   Pleasant gentleman.  He has no hernias palpable.  No tenderness palpation.  No masses in the abdomen.                    "

## 2023-01-25 ENCOUNTER — OFFICE VISIT (OUTPATIENT)
Dept: INTERNAL MEDICINE | Facility: CLINIC | Age: 43
End: 2023-01-25
Payer: COMMERCIAL

## 2023-01-25 VITALS
BODY MASS INDEX: 21.33 KG/M2 | SYSTOLIC BLOOD PRESSURE: 122 MMHG | WEIGHT: 144 LBS | TEMPERATURE: 98 F | DIASTOLIC BLOOD PRESSURE: 78 MMHG | HEIGHT: 69 IN | OXYGEN SATURATION: 100 % | HEART RATE: 60 BPM | RESPIRATION RATE: 18 BRPM

## 2023-01-25 DIAGNOSIS — Z00.00 ROUTINE GENERAL MEDICAL EXAMINATION AT A HEALTH CARE FACILITY: Primary | ICD-10-CM

## 2023-01-25 DIAGNOSIS — R31.29 MICROSCOPIC HEMATURIA: ICD-10-CM

## 2023-01-25 DIAGNOSIS — R10.31 RIGHT LOWER QUADRANT PAIN: ICD-10-CM

## 2023-01-25 DIAGNOSIS — N32.89 BLADDER WALL THICKENING: ICD-10-CM

## 2023-01-25 LAB
CHOLEST SERPL-MCNC: 286 MG/DL
FASTING STATUS PATIENT QL REPORTED: YES
GLUCOSE SERPL-MCNC: 106 MG/DL (ref 70–99)
HDLC SERPL-MCNC: 131 MG/DL
LDLC SERPL CALC-MCNC: 148 MG/DL
NONHDLC SERPL-MCNC: 155 MG/DL
TRIGL SERPL-MCNC: 37 MG/DL

## 2023-01-25 PROCEDURE — 36415 COLL VENOUS BLD VENIPUNCTURE: CPT | Performed by: INTERNAL MEDICINE

## 2023-01-25 PROCEDURE — 82947 ASSAY GLUCOSE BLOOD QUANT: CPT | Performed by: INTERNAL MEDICINE

## 2023-01-25 PROCEDURE — 99213 OFFICE O/P EST LOW 20 MIN: CPT | Mod: 25 | Performed by: INTERNAL MEDICINE

## 2023-01-25 PROCEDURE — 80061 LIPID PANEL: CPT | Performed by: INTERNAL MEDICINE

## 2023-01-25 PROCEDURE — 99396 PREV VISIT EST AGE 40-64: CPT | Performed by: INTERNAL MEDICINE

## 2023-01-25 ASSESSMENT — ENCOUNTER SYMPTOMS
FEVER: 0
HEMATOCHEZIA: 0
DIZZINESS: 0
SHORTNESS OF BREATH: 0
NAUSEA: 0
HEMATURIA: 0
HEARTBURN: 0
EYE PAIN: 0
CHILLS: 0
SORE THROAT: 0
ABDOMINAL PAIN: 0
MYALGIAS: 0
DYSURIA: 0
JOINT SWELLING: 0
PARESTHESIAS: 0
WEAKNESS: 0
PALPITATIONS: 0
HEADACHES: 0
CONSTIPATION: 0
FREQUENCY: 0
NERVOUS/ANXIOUS: 0
DIARRHEA: 0
COUGH: 0
ARTHRALGIAS: 0

## 2023-01-25 NOTE — PROGRESS NOTES
SUBJECTIVE:   CC: Jean-Claude is an 42 year old who presents for preventative health visit.     Jean-Claude comes in today for follow-up and for physical.  He continues to have that right lower abdominal pain although it has improved considerably and is now rated at a 2 or less out of 10.  He notes it does not radiate laterally or superiorly.  It is just in one area just below and to the right of the umbilicus.  He was on vacation in [place] and did not notice it at all.  When he is sitting in his chair he will notice it.  When he is wrestling around with his boys he will notice it.  He believes it is likely muscular.  He did have CT scan which did not show anything concerning there.  He did have some bladder thickening.  He has had extensive urologic evaluations in the past.    Kids are doing well Masters in [place].  Therefore and almost 2.  No further children plan.  He just got promoted to  at [employer].  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  Mother is doing well.  Father passed from pulmonary fibrosis.  He had RA.  Sister has 3 kids under age 2-1/2.  Doing well.  Healthy Habits:     Getting at least 3 servings of Calcium per day:  Yes    Bi-annual eye exam:  Yes    Dental care twice a year:  Yes    Sleep apnea or symptoms of sleep apnea:  None    Diet:  Regular (no restrictions)    Frequency of exercise:  6-7 days/week    Duration of exercise:  Greater than 60 minutes    Taking medications regularly:  Yes    Medication side effects:  None    PHQ-2 Total Score: 0    Additional concerns today:  No              Today's PHQ-2 Score:   PHQ-2 ( 1999 [employer]) 1/25/2023   Q1: Little interest or pleasure in doing things 0   Q2: Feeling down, depressed or hopeless 0   PHQ-2 Score 0   PHQ-2 Total Score (12-17 Years)- Positive if 3 or more points; Administer PHQ-A if positive -   Q1: Little interest or pleasure in doing things Not at all   Q2: Feeling down, depressed or hopeless Not at all   PHQ-2 Score 0           Social History  "    Tobacco Use     Smoking status: Never     Smokeless tobacco: Never   Substance Use Topics     Alcohol use: Not on file         Alcohol Use 1/25/2023   Prescreen: >3 drinks/day or >7 drinks/week? No       Last PSA:   Prostate Specific Antigen Screen   Date Value Ref Range Status   11/10/2009 0.82 <4.01 ng/mL Final       Reviewed orders with patient. Reviewed health maintenance and updated orders accordingly -       Reviewed and updated as needed this visit by clinical staff    Allergies  Meds              Reviewed and updated as needed this visit by Provider                     Review of Systems   Constitutional: Negative for chills and fever.   HENT: Negative for congestion, ear pain, hearing loss and sore throat.    Eyes: Negative for pain and visual disturbance.   Respiratory: Negative for cough and shortness of breath.    Cardiovascular: Negative for chest pain, palpitations and peripheral edema.   Gastrointestinal: Negative for abdominal pain, constipation, diarrhea, heartburn, hematochezia and nausea.   Genitourinary: Negative for dysuria, frequency, genital sores, hematuria, impotence, penile discharge and urgency.   Musculoskeletal: Negative for arthralgias, joint swelling and myalgias.   Skin: Negative for rash.   Neurological: Negative for dizziness, weakness, headaches and paresthesias.   Psychiatric/Behavioral: Negative for mood changes. The patient is not nervous/anxious.          OBJECTIVE:   /78 (BP Location: Left arm, Patient Position: Sitting, Cuff Size: Adult Regular)   Pulse 60   Temp 98  F (36.7  C)   Resp 18   Ht 1.74 m (5' 8.5\")   Wt 65.3 kg (144 lb)   SpO2 100%   BMI 21.58 kg/m      Exam:  Constitutional: healthy, alert and no distress  Head: Normocephalic. No masses, lesions, tenderness or abnormalities  Neck: Neck supple. No adenopathy. Thyroid symmetric, normal size,  ENT: ENT exam normal  Cardiovascular: negative, PMI normal. No lifts, heaves, or thrills. RRR. No murmurs, " clicks gallops or rub  Respiratory: negative  Gastrointestinal: Abdomen soft, non-tender. BS normal. No masses, organomegaly  : Deferred  Musculoskeletal: extremities normal- no gross deformities noted, gait normal and normal muscle tone  Skin: no suspicious lesions or rashes  Neurologic: Gait normal. Reflexes normal and symmetric. Sensation grossly WNL.  Psychiatric: mentation appears normal and affect normal/bright  Hematologic/Lymphatic/Immunologic: Normal cervical lymph nodes    ASSESSMENT/PLAN:       1. Routine general medical examination at a health care facility  He lives an active and healthy lifestyle.  Continue same  - Lipid panel reflex to direct LDL Fasting; Future  - Glucose; Future    2. Right lower quadrant pain  I truly think this is musculoskeletal.  It is already getting better.  He had similar symptoms about 10 years ago and was felt to be muscular as well.  I have urged him to do the things that he notes improves it like standing and not wrestling with his kids.  He could consider some lidocaine patches over the area.  If it worsens he will let me know.  I do want to have him just meet with a urologist given the thickened bladder on CT scan and his history of microscopic materia as its been sometime since he saw them  - Adult Urology  Referral; Future    3. Microscopic hematuria  As above  - Adult Urology  Referral; Future    4. Bladder wall thickening  As above  - Adult Urology  Referral; Future      COUNSELING:   Reviewed preventive health counseling, as reflected in patient instructions        He reports that he has never smoked. He has never used smokeless tobacco.        JOSIE POTTER MD  [facility]

## 2023-01-31 ENCOUNTER — TELEPHONE (OUTPATIENT)
Dept: INTERNAL MEDICINE | Facility: CLINIC | Age: 43
End: 2023-01-31

## 2023-01-31 NOTE — TELEPHONE ENCOUNTER
General Call      Reason for Call:  Referral to Urology and CT Scan    What are your questions or concerns:  Patient is calling stating [facility] did not receive referral and they will also need patient last CT Scan fax to them. Patient has an appointment tomorrow at 9:15 AM with [facility].       Could we send this information to you in [place] or would you prefer to receive a phone call?:   Patient would prefer a phone call   Okay to leave a detailed message?: Yes at Cell number on file:    Telephone Information:   Mobile [phone]

## 2023-02-01 ENCOUNTER — TRANSFERRED RECORDS (OUTPATIENT)
Dept: HEALTH INFORMATION MANAGEMENT | Facility: CLINIC | Age: 43
End: 2023-02-01

## 2023-02-28 ENCOUNTER — MYC MEDICAL ADVICE (OUTPATIENT)
Dept: INTERNAL MEDICINE | Facility: CLINIC | Age: 43
End: 2023-02-28

## 2023-03-01 DIAGNOSIS — R61 EXCESSIVE SWEATING: ICD-10-CM

## 2023-03-01 NOTE — TELEPHONE ENCOUNTER
Routing refill request to provider for review/approval because:  Drug not on the [facility] refill protocol     Last Written Prescription Date:  7/14/21  Last Fill Quantity: 180,  # refills: 1  Last office visit provider:  1/25/23     Requested Prescriptions   Pending Prescriptions Disp Refills     glycopyrrolate (ROBINUL) 1 MG tablet 180 tablet 1     Sig: [GLYCOPYRROLATE (ROBINUL) 1 MG TABLET] Take one tablet up to two times daily.       There is no refill protocol information for this order          Lindsay Estrada RN 03/01/23 2:06 PM

## 2023-03-02 RX ORDER — GLYCOPYRROLATE 1 MG/1
TABLET ORAL
Qty: 180 TABLET | Refills: 1 | Status: SHIPPED | OUTPATIENT
Start: 2023-03-02 | End: 2024-03-04

## 2023-04-26 ENCOUNTER — OFFICE VISIT (OUTPATIENT)
Dept: INTERNAL MEDICINE | Facility: CLINIC | Age: 43
End: 2023-04-26
Payer: COMMERCIAL

## 2023-04-26 VITALS
BODY MASS INDEX: 22.07 KG/M2 | DIASTOLIC BLOOD PRESSURE: 78 MMHG | HEART RATE: 73 BPM | HEIGHT: 69 IN | SYSTOLIC BLOOD PRESSURE: 128 MMHG | OXYGEN SATURATION: 100 % | RESPIRATION RATE: 18 BRPM | WEIGHT: 149 LBS | TEMPERATURE: 97.8 F

## 2023-04-26 DIAGNOSIS — R73.9 HYPERGLYCEMIA: ICD-10-CM

## 2023-04-26 DIAGNOSIS — R20.0 BILATERAL HAND NUMBNESS: Primary | ICD-10-CM

## 2023-04-26 DIAGNOSIS — M54.50 ACUTE RIGHT-SIDED LOW BACK PAIN WITHOUT SCIATICA: ICD-10-CM

## 2023-04-26 LAB
ALBUMIN UR-MCNC: NEGATIVE MG/DL
APPEARANCE UR: CLEAR
BACTERIA #/AREA URNS HPF: ABNORMAL /HPF
BILIRUB UR QL STRIP: NEGATIVE
COLOR UR AUTO: YELLOW
GLUCOSE UR STRIP-MCNC: NEGATIVE MG/DL
HBA1C MFR BLD: 5 % (ref 0–5.6)
HGB UR QL STRIP: ABNORMAL
KETONES UR STRIP-MCNC: NEGATIVE MG/DL
LEUKOCYTE ESTERASE UR QL STRIP: NEGATIVE
NITRATE UR QL: NEGATIVE
PH UR STRIP: 6.5 [PH] (ref 5–8)
RBC #/AREA URNS AUTO: ABNORMAL /HPF
SP GR UR STRIP: 1.01 (ref 1–1.03)
SQUAMOUS #/AREA URNS AUTO: ABNORMAL /LPF
TSH SERPL DL<=0.005 MIU/L-ACNC: 1.39 UIU/ML (ref 0.3–4.2)
UROBILINOGEN UR STRIP-ACNC: 0.2 E.U./DL
VIT B12 SERPL-MCNC: 671 PG/ML (ref 232–1245)
WBC #/AREA URNS AUTO: ABNORMAL /HPF

## 2023-04-26 PROCEDURE — 82607 VITAMIN B-12: CPT | Performed by: INTERNAL MEDICINE

## 2023-04-26 PROCEDURE — 84443 ASSAY THYROID STIM HORMONE: CPT | Performed by: INTERNAL MEDICINE

## 2023-04-26 PROCEDURE — 36415 COLL VENOUS BLD VENIPUNCTURE: CPT | Performed by: INTERNAL MEDICINE

## 2023-04-26 PROCEDURE — 99214 OFFICE O/P EST MOD 30 MIN: CPT | Performed by: INTERNAL MEDICINE

## 2023-04-26 PROCEDURE — 83036 HEMOGLOBIN GLYCOSYLATED A1C: CPT | Performed by: INTERNAL MEDICINE

## 2023-04-26 PROCEDURE — 81001 URINALYSIS AUTO W/SCOPE: CPT | Performed by: INTERNAL MEDICINE

## 2023-04-26 ASSESSMENT — ENCOUNTER SYMPTOMS: NUMBNESS: 1

## 2023-04-26 NOTE — PROGRESS NOTES
DME (Durable Medical Equipment) Orders and Documentation  Orders Placed This Encounter   Procedures     Wrist/Arm Supplies Order Wrist Brace; Bilateral; non-thumb spica      The patient was assessed and it was determined the patient is in need of the following listed DME Supplies/Equipment. Please complete supporting documentation below to demonstrate medical necessity.      Wrist/Arm/Hand Bracing Supplies Documentation  Patient requires the use of the ordered bracing device due to following medical need/condition: ***

## 2023-04-26 NOTE — PROGRESS NOTES
1. Bilateral hand numbness  Lengthy discussion.  I discussed with him that the most likely cause of his symptoms at this young age is some mild carpal tunnel.  Symptoms seem worse in the morning.  We will put him in some carpal tunnel splints and see if that improves things.  If they are not improving or worsening would recommend EMG nerve conduction studies.  - UA Macro with Reflex to Micro and Culture - lab collect; Future  - Hemoglobin A1c; Future  - TSH with free T4 reflex; Future  - Vitamin B12; Future  - Wrist/Arm Supplies Order Wrist Brace; Bilateral; non-thumb spica    2. Acute right-sided low back pain without sciatica  I think this is unrelated to the hand numbness I discussed this with him.  Seems to be better.  I recommended some low-dose ibuprofen and let me know if it persists and we can get him into see physical therapy.    3. Hyperglycemia  Mild high sugar at his last visit.  We will have him undergo labs as below.  No symptoms of hyperglycemia or diabetes except for the hand numbness.  - UA Macro with Reflex to Micro and Culture - lab collect; Future  - Hemoglobin A1c; Future    Subjective   Jean-Claude is a 42 year old, presenting for the following health issues:  Numbness (Intermittent bilateral muscle pain and fingertip sensitivity/numbness x 1 week )        4/26/2023    11:07 AM   Additional Questions   Roomed by Alissa LEWIS     Numbness  Associated symptoms include numbness.   History of Present Illness       Reason for visit:  Muscle pain and fingertip sensitivity    He eats 4 or more servings of fruits and vegetables daily.He consumes 0 sweetened beverage(s) daily.He exercises with enough effort to increase his heart rate 60 or more minutes per day.  He exercises with enough effort to increase his heart rate 7 days per week.   He is taking medications regularly.         Jean-Claude comes in today for follow-up.  He tells me that his belly pain is at least 90% improved.  He was recently on vacation in  "[place] and felt completely normal with no issues.  However on return he has not been feeling well.  He noticed some right-sided low back pain.  No radiation.  Comes and goes.  Then he started developing left finger numbness in the distal fingertips on the third and fourth digit on the left and now he has it on the right as well.  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  Despite all of this he remains very active and was able to run 20 miles this weekend.      Review of Systems   Neurological: Positive for numbness.            Objective    /78 (BP Location: Left arm, Patient Position: Sitting, Cuff Size: Adult Regular)   Pulse 73   Temp 97.8  F (36.6  C) (Tympanic)   Resp 18   Ht 1.74 m (5' 8.5\")   Wt 67.6 kg (149 lb)   SpO2 100%   BMI 22.33 kg/m    Body mass index is 22.33 kg/m .  Physical Exam     Pleasant gentleman.  Back is normal.  No spinal tenderness to palpation no paraspinal moderate muscle tenderness to palpation.  Good range of motion.  No focal neurologic deficits.  No weakness of the upper extremities.              DME (Durable Medical Equipment) Orders and Documentation  Orders Placed This Encounter   Procedures     Wrist/Arm Supplies Order Wrist Brace; Bilateral; non-thumb spica      The patient was assessed and it was determined the patient is in need of the following listed DME Supplies/Equipment. Please complete supporting documentation below to demonstrate medical necessity.      Wrist/Arm/Hand Bracing Supplies Documentation  Patient requires the use of the ordered bracing device due to following medical need/condition: carpal tunnel        "

## 2024-01-30 ASSESSMENT — ANXIETY QUESTIONNAIRES
6. BECOMING EASILY ANNOYED OR IRRITABLE: NOT AT ALL
2. NOT BEING ABLE TO STOP OR CONTROL WORRYING: NOT AT ALL
3. WORRYING TOO MUCH ABOUT DIFFERENT THINGS: SEVERAL DAYS
1. FEELING NERVOUS, ANXIOUS, OR ON EDGE: NOT AT ALL
GAD7 TOTAL SCORE: 1
GAD7 TOTAL SCORE: 1
IF YOU CHECKED OFF ANY PROBLEMS ON THIS QUESTIONNAIRE, HOW DIFFICULT HAVE THESE PROBLEMS MADE IT FOR YOU TO DO YOUR WORK, TAKE CARE OF THINGS AT HOME, OR GET ALONG WITH OTHER PEOPLE: NOT DIFFICULT AT ALL
7. FEELING AFRAID AS IF SOMETHING AWFUL MIGHT HAPPEN: NOT AT ALL
3. WORRYING TOO MUCH ABOUT DIFFERENT THINGS: SEVERAL DAYS
1. FEELING NERVOUS, ANXIOUS, OR ON EDGE: NOT AT ALL
8. IF YOU CHECKED OFF ANY PROBLEMS, HOW DIFFICULT HAVE THESE MADE IT FOR YOU TO DO YOUR WORK, TAKE CARE OF THINGS AT HOME, OR GET ALONG WITH OTHER PEOPLE?: NOT DIFFICULT AT ALL
8. IF YOU CHECKED OFF ANY PROBLEMS, HOW DIFFICULT HAVE THESE MADE IT FOR YOU TO DO YOUR WORK, TAKE CARE OF THINGS AT HOME, OR GET ALONG WITH OTHER PEOPLE?: NOT DIFFICULT AT ALL
7. FEELING AFRAID AS IF SOMETHING AWFUL MIGHT HAPPEN: NOT AT ALL
7. FEELING AFRAID AS IF SOMETHING AWFUL MIGHT HAPPEN: NOT AT ALL
5. BEING SO RESTLESS THAT IT IS HARD TO SIT STILL: NOT AT ALL
GAD7 TOTAL SCORE: 1
2. NOT BEING ABLE TO STOP OR CONTROL WORRYING: NOT AT ALL
4. TROUBLE RELAXING: NOT AT ALL
4. TROUBLE RELAXING: NOT AT ALL
6. BECOMING EASILY ANNOYED OR IRRITABLE: NOT AT ALL
7. FEELING AFRAID AS IF SOMETHING AWFUL MIGHT HAPPEN: NOT AT ALL
IF YOU CHECKED OFF ANY PROBLEMS ON THIS QUESTIONNAIRE, HOW DIFFICULT HAVE THESE PROBLEMS MADE IT FOR YOU TO DO YOUR WORK, TAKE CARE OF THINGS AT HOME, OR GET ALONG WITH OTHER PEOPLE: NOT DIFFICULT AT ALL
GAD7 TOTAL SCORE: 1
5. BEING SO RESTLESS THAT IT IS HARD TO SIT STILL: NOT AT ALL

## 2024-02-01 ENCOUNTER — VIRTUAL VISIT (OUTPATIENT)
Dept: INTERNAL MEDICINE | Facility: CLINIC | Age: 44
End: 2024-02-01
Payer: COMMERCIAL

## 2024-02-01 DIAGNOSIS — Z00.00 VISIT FOR PREVENTIVE HEALTH EXAMINATION: ICD-10-CM

## 2024-02-01 DIAGNOSIS — Z00.00 ENCOUNTER FOR PREVENTIVE HEALTH EXAMINATION: Primary | ICD-10-CM

## 2024-02-01 PROCEDURE — 99207 PR NO CHARGE LOS: CPT | Mod: 93

## 2024-02-01 NOTE — PROGRESS NOTES
Health Maintenance:  Do you have a PCP? Yes  When was your last visit with your PCP? 1/23  When was your last eye exam? 1/23  Have you ever had a colonoscopy? No  If yes, when?   Have you ever had any polyps removed?     As part of your visit we will set up a DEXA scan which will measure your body composition. We have a few questions that need to be answered before we can schedule this scan:   What is your approximate weight? 146   Have you ever had a DEXA scan within the past 2 years? No   Will you have any other imaging studies with contrast (x-ray, CT scan) within 7 days of this appointment? No   Have you had any spine or hip surgery? No   Do you take any vitamins that contain calcium or antacids with calcium? No    If yes, stop taking 24 hours prior to visit.     Goals for the Visit:  Thorough Comprehensive Preventive Exam    Pertinent past Medical/Family and Social HX:   Pertinent sx that desire are addressed with this visit:     Answers submitted by the patient for this visit:  HOUSTON-7 (Submitted on 1/30/2024)  HOUSTON 7 TOTAL SCORE: 1      Instructions prior to appointment:   1. Fast beginning at 10 pm for lab appointment  2. If your preventive care assessment package includes a Fitness Assessment, please bring athletic shoes. Complementary [facility] fitness attire is provided and yours to keep.  3. If eye exam, eyes may be dilated, it will last 4-6 hours, may want to bring sunglasses.   4. May bring laptop or other work materials for use during downtime.   5. You will receive an email about 3 days prior to your visit with a final itinerary, menu selections for the complementary breakfast and lunch and instructions for the visit.     Complimentary  Parking provided. Drop off car in front of [facility], take the patient elevators to the [facility]. When you enter in the lobby, identify yourself as an [facility] [atient and you will be escorted  up to the clinic.   If questions arise prior to your appointment please contact the clinic at [phone].

## 2024-02-06 DIAGNOSIS — R31.29 MICROSCOPIC HEMATURIA: Primary | ICD-10-CM

## 2024-02-07 ENCOUNTER — APPOINTMENT (OUTPATIENT)
Dept: INTERNAL MEDICINE | Facility: CLINIC | Age: 44
End: 2024-02-07

## 2024-02-07 ENCOUNTER — OFFICE VISIT (OUTPATIENT)
Dept: OPHTHALMOLOGY | Facility: CLINIC | Age: 44
End: 2024-02-07

## 2024-02-07 ENCOUNTER — TELEPHONE (OUTPATIENT)
Dept: OPHTHALMOLOGY | Facility: CLINIC | Age: 44
End: 2024-02-07

## 2024-02-07 ENCOUNTER — OFFICE VISIT (OUTPATIENT)
Dept: GASTROENTEROLOGY | Facility: CLINIC | Age: 44
End: 2024-02-07

## 2024-02-07 ENCOUNTER — OFFICE VISIT (OUTPATIENT)
Dept: PULMONOLOGY | Facility: CLINIC | Age: 44
End: 2024-02-07

## 2024-02-07 ENCOUNTER — ANCILLARY PROCEDURE (OUTPATIENT)
Dept: BONE DENSITY | Facility: CLINIC | Age: 44
End: 2024-02-07

## 2024-02-07 ENCOUNTER — OFFICE VISIT (OUTPATIENT)
Dept: AUDIOLOGY | Facility: CLINIC | Age: 44
End: 2024-02-07

## 2024-02-07 ENCOUNTER — OFFICE VISIT (OUTPATIENT)
Dept: INTERNAL MEDICINE | Facility: CLINIC | Age: 44
End: 2024-02-07

## 2024-02-07 ENCOUNTER — TELEPHONE (OUTPATIENT)
Dept: DERMATOLOGY | Facility: CLINIC | Age: 44
End: 2024-02-07

## 2024-02-07 VITALS
SYSTOLIC BLOOD PRESSURE: 146 MMHG | RESPIRATION RATE: 16 BRPM | TEMPERATURE: 97.7 F | DIASTOLIC BLOOD PRESSURE: 91 MMHG | BODY MASS INDEX: 22.43 KG/M2 | OXYGEN SATURATION: 100 % | WEIGHT: 148 LBS | HEART RATE: 71 BPM | HEIGHT: 68 IN

## 2024-02-07 DIAGNOSIS — E78.5 DYSLIPIDEMIA: ICD-10-CM

## 2024-02-07 DIAGNOSIS — R31.29 MICROSCOPIC HEMATURIA: ICD-10-CM

## 2024-02-07 DIAGNOSIS — Z00.00 VISIT FOR PREVENTIVE HEALTH EXAMINATION: ICD-10-CM

## 2024-02-07 DIAGNOSIS — Z00.00 ENCOUNTER FOR PREVENTIVE HEALTH EXAMINATION: Primary | ICD-10-CM

## 2024-02-07 DIAGNOSIS — Z00.00 ENCOUNTER FOR PREVENTIVE HEALTH EXAMINATION: ICD-10-CM

## 2024-02-07 DIAGNOSIS — Z71.82 EXERCISE COUNSELING: Primary | ICD-10-CM

## 2024-02-07 DIAGNOSIS — R94.31 NONSPECIFIC ABNORMAL ELECTROCARDIOGRAM (ECG) (EKG): Primary | ICD-10-CM

## 2024-02-07 DIAGNOSIS — D31.31 CHOROIDAL NEVUS, RIGHT EYE: ICD-10-CM

## 2024-02-07 DIAGNOSIS — H52.13 MYOPIA OF BOTH EYES: Primary | ICD-10-CM

## 2024-02-07 DIAGNOSIS — Z01.10 HEARING WITHIN NORMAL LIMITS IN BOTH EARS: Primary | ICD-10-CM

## 2024-02-07 LAB
ALBUMIN UR-MCNC: NEGATIVE MG/DL
ALP SERPL-CCNC: 64 U/L (ref 40–150)
ALT SERPL W P-5'-P-CCNC: 25 U/L (ref 0–70)
APPEARANCE UR: CLEAR
BASOPHILS # BLD AUTO: 0.1 10E3/UL (ref 0–0.2)
BASOPHILS NFR BLD AUTO: 1 %
BILIRUB UR QL STRIP: NEGATIVE
CHOLEST SERPL-MCNC: 289 MG/DL
COLOR UR AUTO: ABNORMAL
CREAT SERPL-MCNC: 0.77 MG/DL (ref 0.67–1.17)
EGFRCR SERPLBLD CKD-EPI 2021: >90 ML/MIN/1.73M2
EOSINOPHIL # BLD AUTO: 0.3 10E3/UL (ref 0–0.7)
EOSINOPHIL NFR BLD AUTO: 6 %
ERYTHROCYTE [DISTWIDTH] IN BLOOD BY AUTOMATED COUNT: 11.9 % (ref 10–15)
FASTING STATUS PATIENT QL REPORTED: ABNORMAL
FASTING STATUS PATIENT QL REPORTED: ABNORMAL
GLUCOSE SERPL-MCNC: 108 MG/DL (ref 70–99)
GLUCOSE UR STRIP-MCNC: NEGATIVE MG/DL
HCT VFR BLD AUTO: 43.9 % (ref 40–53)
HDLC SERPL-MCNC: 136 MG/DL
HGB BLD-MCNC: 15.2 G/DL (ref 13.3–17.7)
HGB UR QL STRIP: ABNORMAL
HIV 1+2 AB+HIV1 P24 AG SERPL QL IA: NONREACTIVE
HOLD SPECIMEN: NORMAL
IMM GRANULOCYTES # BLD: 0 10E3/UL
IMM GRANULOCYTES NFR BLD: 0 %
KETONES UR STRIP-MCNC: NEGATIVE MG/DL
LDLC SERPL CALC-MCNC: 143 MG/DL
LEUKOCYTE ESTERASE UR QL STRIP: NEGATIVE
LYMPHOCYTES # BLD AUTO: 1.5 10E3/UL (ref 0.8–5.3)
LYMPHOCYTES NFR BLD AUTO: 29 %
MCH RBC QN AUTO: 32.3 PG (ref 26.5–33)
MCHC RBC AUTO-ENTMCNC: 34.6 G/DL (ref 31.5–36.5)
MCV RBC AUTO: 93 FL (ref 78–100)
MONOCYTES # BLD AUTO: 0.5 10E3/UL (ref 0–1.3)
MONOCYTES NFR BLD AUTO: 9 %
NEUTROPHILS # BLD AUTO: 2.7 10E3/UL (ref 1.6–8.3)
NEUTROPHILS NFR BLD AUTO: 55 %
NITRATE UR QL: NEGATIVE
NONHDLC SERPL-MCNC: 153 MG/DL
NRBC # BLD AUTO: 0 10E3/UL
NRBC BLD AUTO-RTO: 0 /100
PH UR STRIP: 6.5 [PH] (ref 5–7)
PLATELET # BLD AUTO: 229 10E3/UL (ref 150–450)
RBC # BLD AUTO: 4.7 10E6/UL (ref 4.4–5.9)
RBC URINE: 1 /HPF
SP GR UR STRIP: 1 (ref 1–1.03)
TRIGL SERPL-MCNC: 48 MG/DL
TSH SERPL DL<=0.005 MIU/L-ACNC: 2.08 UIU/ML (ref 0.3–4.2)
UROBILINOGEN UR STRIP-MCNC: NORMAL MG/DL
VIT D+METAB SERPL-MCNC: 44 NG/ML (ref 20–50)
WBC # BLD AUTO: 5 10E3/UL (ref 4–11)
WBC URINE: <1 /HPF

## 2024-02-07 PROCEDURE — 99207 PR NO BILLABLE SERVICE THIS VISIT: CPT | Performed by: DIETITIAN, REGISTERED

## 2024-02-07 PROCEDURE — 36415 COLL VENOUS BLD VENIPUNCTURE: CPT | Performed by: PATHOLOGY

## 2024-02-07 PROCEDURE — 77080 DXA BONE DENSITY AXIAL: CPT | Performed by: INTERNAL MEDICINE

## 2024-02-07 PROCEDURE — 96999 UNLISTED SPEC DERM SVC/PX: CPT | Performed by: INTERNAL MEDICINE

## 2024-02-07 PROCEDURE — 86141 C-REACTIVE PROTEIN HS: CPT | Performed by: INTERNAL MEDICINE

## 2024-02-07 PROCEDURE — 99396 PREV VISIT EST AGE 40-64: CPT | Performed by: INTERNAL MEDICINE

## 2024-02-07 PROCEDURE — 84460 ALANINE AMINO (ALT) (SGPT): CPT | Performed by: PATHOLOGY

## 2024-02-07 PROCEDURE — 92015 DETERMINE REFRACTIVE STATE: CPT | Performed by: OPTOMETRIST

## 2024-02-07 PROCEDURE — 82565 ASSAY OF CREATININE: CPT | Performed by: PATHOLOGY

## 2024-02-07 PROCEDURE — 92550 TYMPANOMETRY & REFLEX THRESH: CPT | Performed by: AUDIOLOGIST

## 2024-02-07 PROCEDURE — 93000 ELECTROCARDIOGRAM COMPLETE: CPT | Performed by: INTERNAL MEDICINE

## 2024-02-07 PROCEDURE — 84075 ASSAY ALKALINE PHOSPHATASE: CPT | Performed by: PATHOLOGY

## 2024-02-07 PROCEDURE — 85025 COMPLETE CBC W/AUTO DIFF WBC: CPT | Performed by: PATHOLOGY

## 2024-02-07 PROCEDURE — 99207 PR NO CHARGE LOS: CPT

## 2024-02-07 PROCEDURE — 84443 ASSAY THYROID STIM HORMONE: CPT | Performed by: PATHOLOGY

## 2024-02-07 PROCEDURE — 80061 LIPID PANEL: CPT | Performed by: PATHOLOGY

## 2024-02-07 PROCEDURE — 81001 URINALYSIS AUTO W/SCOPE: CPT | Performed by: PATHOLOGY

## 2024-02-07 PROCEDURE — 82306 VITAMIN D 25 HYDROXY: CPT | Performed by: INTERNAL MEDICINE

## 2024-02-07 PROCEDURE — 82947 ASSAY GLUCOSE BLOOD QUANT: CPT | Performed by: PATHOLOGY

## 2024-02-07 PROCEDURE — 92557 COMPREHENSIVE HEARING TEST: CPT | Performed by: AUDIOLOGIST

## 2024-02-07 PROCEDURE — 94375 RESPIRATORY FLOW VOLUME LOOP: CPT | Performed by: INTERNAL MEDICINE

## 2024-02-07 PROCEDURE — 92004 COMPRE OPH EXAM NEW PT 1/>: CPT | Performed by: OPTOMETRIST

## 2024-02-07 PROCEDURE — 87389 HIV-1 AG W/HIV-1&-2 AB AG IA: CPT | Performed by: INTERNAL MEDICINE

## 2024-02-07 PROCEDURE — 99000 SPECIMEN HANDLING OFFICE-LAB: CPT | Performed by: PATHOLOGY

## 2024-02-07 ASSESSMENT — CONF VISUAL FIELD
OS_SUPERIOR_TEMPORAL_RESTRICTION: 0
OS_INFERIOR_TEMPORAL_RESTRICTION: 0
OS_INFERIOR_NASAL_RESTRICTION: 0
OS_NORMAL: 1
OD_SUPERIOR_NASAL_RESTRICTION: 0
METHOD: COUNTING FINGERS
OD_INFERIOR_TEMPORAL_RESTRICTION: 0
OD_SUPERIOR_TEMPORAL_RESTRICTION: 0
OD_INFERIOR_NASAL_RESTRICTION: 0
OD_NORMAL: 1
OS_SUPERIOR_NASAL_RESTRICTION: 0

## 2024-02-07 ASSESSMENT — REFRACTION_WEARINGRX
OD_CYLINDER: +0.75
OS_CYLINDER: SPHERE
OS_SPHERE: -0.50
OD_AXIS: 044
OD_SPHERE: -1.25
SPECS_TYPE: SVL

## 2024-02-07 ASSESSMENT — TONOMETRY
OD_IOP_MMHG: 16
OS_IOP_MMHG: 14
IOP_METHOD: ICARE

## 2024-02-07 ASSESSMENT — REFRACTION_MANIFEST
OS_ADD: +1.00
OD_AXIS: 045
OD_SPHERE: -1.50
OS_SPHERE: -0.50
OD_ADD: +1.00
OS_CYLINDER: SPHERE
OD_CYLINDER: +0.75

## 2024-02-07 ASSESSMENT — VISUAL ACUITY
OD_CC: 20/25
OD_SC: J1+
OD_CC+: +2
OS_CC: 20/20
CORRECTION_TYPE: GLASSES
OS_SC: J1+
METHOD: SNELLEN - LINEAR

## 2024-02-07 ASSESSMENT — EXTERNAL EXAM - RIGHT EYE: OD_EXAM: NORMAL

## 2024-02-07 ASSESSMENT — SLIT LAMP EXAM - LIDS
COMMENTS: NORMAL
COMMENTS: NORMAL

## 2024-02-07 ASSESSMENT — PAIN SCALES - GENERAL: PAINLEVEL: NO PAIN (0)

## 2024-02-07 ASSESSMENT — EXTERNAL EXAM - LEFT EYE: OS_EXAM: NORMAL

## 2024-02-07 ASSESSMENT — CUP TO DISC RATIO
OD_RATIO: 0.2
OS_RATIO: 0.2

## 2024-02-07 NOTE — PATIENT INSTRUCTIONS
It was nice meeting you today. Below are the nutrition recommendations we discussed at your visit.    Please let me know if you have any additional questions.    Nutrition Recommendations    Can use the Plate method plan for general guidance on getting balanced meals     Make half of your plate vegetables and fruit.      Make 1/4 of your plate lean protein sources at a meal (salmon/fish, skinless chicken/turkey breast, pork tenderloin, lean cuts of beef, black or kidney or tuttle beans, tofu, edamame, tempeh, eggs, egg whites, lowfat cottage cheese, lowfat yogurt).     Make the other 1/4 of the plate whole grain starches/grains/starchy vegetables at meals. Some examples include quinoa, brown rice, wild rice, barley, whole grain tortilla or starchy vegetables (potatoes, sweet potatoes, winter squash, peas, corn).     Include some healthy/unsaturated fat servings at a meal (avocados, nut butters, nuts/seeds, olive oil, vegetable oils, flaxseed, den seeds).    2.  Include good sources of soluble fiber in your diet such as oats, barley, apple, pears, oranges, avocado, berries, beans/legumes, den seeds, flaxseeds, brussels sprouts, carrots, banana, nuts (especially almonds), figs.    --a few ideas to increase soluble fiber could be to add some den seeds or flaxseeds to your morning yogurt meals    --could add slices of avocado onto your salad at lunch    --can have some nuts for an afternoon snack along with some vegetables or fruit    3.  Continue to drink at least 48 oz-64 oz water per day.    4.  On days when having alcoholic beverages aim for having 2 standard drinks or less on those days.    --1 standard drink is equal to 5 oz wine, 12 oz beer or one and a half ounce hard liquor.    Thank you,    Lindsey Olivia, MS, RD, LD

## 2024-02-07 NOTE — TELEPHONE ENCOUNTER
Left Voicemail (1st Attempt) and Sent Mychart (1st Attempt) for the patient to call back and schedule the following:    Appointment type: SBA  Provider: Alec Pro  Return date: 2/7/24  Specialty phone number: [phone]

## 2024-02-07 NOTE — PATIENT INSTRUCTIONS
"Jean-Claude,    Welcome to the Yogesh Treadmill Protocol Stage 6 club! This protocol only has 7 stages and you're only the second person I've tested at [facility] to reach this level. Your VO2max is in the 99th percentile for men of your age group. VO2max is the best predictor of cardiovascular mortality we know of and your extremely high value means your risk is very low. Your cardiovascular routine has a good balance of different modalities as well a harder and easier days. In short, keep it up.    The one area to work on is your strength with some changes to your weight training routine. I suggest two things: lifting until \"failure minus 2\" and adding heavier lower body exercises, such as leg press.    Failure minus two    As a rule of thumb, perform repetitions during each set until you reach  failure minus 2.  That means you stop when you can only complete two more repetitions with good form/technique. Research indicates that lifting until failure or failure minus two results in almost identical improvements to your strength, muscle mass, etc. However, stopping a couple of repetitions before failure is safer because your technique is better, and you often are less sore afterward.    Importantly, failure minus two applies when you re focusing on strength (~4-6 reps), endurance (15+ reps), or somewhere in between. If you reach failure minus two and miss your target rep range, add or subtract weight to get the desired number of reps during subsequent sets. For example, add some weight if you intended on a rep range of 8-12 but reached failure minus two at 15 reps on your first set.     Finally, suppose you reach failure minus 2 in far fewer reps per set as the workout continues. In that case, you re likely not taking enough rest between sets, or the weight is too heavy. For example, if you reach failure minus two in 12, 9, and then 7 reps per set, you should extend the rest period between each set. In contrast, " something like 12, 11, and 9 reps per set is usually fine.      Lower body exercises    Your current weight training routine covers your upper body well. I recommend lower body exercises where it's easier to lift heavier loads, such as leg press, squat, deadlift, hip thrust, split squats, or variations therein. Cyclists and swimmers more often experience low bone density compared to runners due to the lack of impact. I like that your routine includes both impact and non-impact cardio, but this makes adding heavier weight training, especially lower body exercises, more important to keep your bones stronger for longer.      If you have questions, please reach out.    Sincerely,    Pan Gonzalez  PhD, Exercise Physiology

## 2024-02-07 NOTE — PROGRESS NOTES
"[facility] Outpatient Medical Nutrition Therapy      Time Spent:  33 minutes  Session Type:  Initial  Referral Source: [facility] [facility]/Dr. Dano Ricci  Reason for RD Visit:   Nutritional counseling     Nutrition Assessment:    Patient is a 43 y.o. male who is here for initial annual visit with Registered Dietitian (RD).    He stated that overall he eats a general healthful diet. He limits red meat and eats a lot of salad and vegetables. He eats 3 meals per day and typically has a snack about  4 PM. Sometimes will have additional snacks and find that he snacks more on days when working from home versus in the office. He is trying to eat a diet that promotes good cholesterol levels. Overall he is interested in diet education for general healthful diet today with any tips and suggestions for following a healthy diet.    Patient Active Problem List   Diagnosis    Urine Tests Nonspecific Abnormal Findings    Functional Murmur    Microscopic Hematuria     Estimated body mass index is 22.5 kg/m  as calculated from the following:    Height as of an earlier encounter on 2/7/24: 1.727 m (5' 8\").    Weight as of an earlier encounter on 2/7/24: 67.1 kg (148 lb).    Diet Recall:  (some usual meals, snacks and beverages):  Meal Food    Breakfast 1/2 granola bar before working out and then at 9 AM: greek yogurt with granola or RX bar   Lunch Salad with protein (HB eggs/tuna/salmon/chicken and sometimes side of pretzels/veggies with dip   Dinner Turkey burger and salad or salmon with green beans or cauliflower rice with veg stir kraus and chicken/shrimp with low sodium sauce   Snacks 4 PM: nuts or pretzels and dips or corn puffs or veggies and guac. On weekends cheese and crackers. In evening tries not to snack but if does similar to other snacks    Beverages 48 oz+ water, 1 can sparkling water per day, 3 cups black coffee   Alcohol Intake Friday, Sat and Sun: has 3-4 drinks (wine, saúl/cocktail or beer). Occas " has 1 drink durng the week.     Frequency of eating/taking out meals: about 2 times per week on weekends.     Labs:  on 2/7/24, Chol 289, LDL chol 143, non HDL chol 153.    Last Comprehensive Metabolic Panel:  Sodium   Date Value Ref Range Status   12/19/2022 137 136 - 145 mmol/L Final     Potassium   Date Value Ref Range Status   12/19/2022 4.5 3.4 - 5.3 mmol/L Final   07/14/2021 4.6 3.5 - 5.0 mmol/L Final     Chloride   Date Value Ref Range Status   12/19/2022 99 98 - 107 mmol/L Final   07/14/2021 101 98 - 107 mmol/L Final     Carbon Dioxide (CO2)   Date Value Ref Range Status   12/19/2022 28 22 - 29 mmol/L Final   07/14/2021 23 22 - 31 mmol/L Final     Anion Gap   Date Value Ref Range Status   12/19/2022 10 7 - 15 mmol/L Final   07/14/2021 15 5 - 18 mmol/L Final     Glucose   Date Value Ref Range Status   02/07/2024 108 (H) 70 - 99 mg/dL Final   07/14/2021 75 70 - 125 mg/dL Final     Urea Nitrogen   Date Value Ref Range Status   12/19/2022 12.4 6.0 - 20.0 mg/dL Final   07/14/2021 13 8 - 22 mg/dL Final     Creatinine   Date Value Ref Range Status   02/07/2024 0.77 0.67 - 1.17 mg/dL Final     GFR Estimate   Date Value Ref Range Status   02/07/2024 >90 >60 mL/min/1.73m2 Final   10/15/2019 >60 >60 mL/min/1.73m2 Final     Calcium   Date Value Ref Range Status   12/19/2022 9.8 8.6 - 10.0 mg/dL Final     CBC RESULTS:   Recent Labs   Lab Test 12/19/22  1019   WBC 7.2   RBC 4.61   HGB 14.9   HCT 43.9   MCV 95   MCH 32.3   MCHC 33.9   RDW 11.7        Pertinent Medications/vitamin and mineral supplements:     Current Outpatient Medications   Medication    glycopyrrolate (ROBINUL) 1 MG tablet     No current facility-administered medications for this visit.     Food Allergies:  NKFA    Physical Activity:  works out about 70 minutes, 7 days per week. (55 minutes of cardio and 10-15 mins of upper body lifting)    Nutrition Prescription: Recommended general healthful diet     Nutrition Intervention:    Nutrition  Education/Counseling:  -Provided general healthful diet nutrition education with tips and suggestions.   -Reviewed the Plate method meal plan with food groups and some example foods in groups.   -Discussed heart healthy diet tips.  -Reviewed the difference between unsaturated and saturated fats with recommendation to aim for choosing unsaturated fat over saturated fats and discussed examples of both.  -Encouraged patient to eat adequate fruit and vegetable servings per day (at least 5 servings but explained recommendation to aim for 9-11 servings per day).   -Discussed adequate hydration/recommendations and encouraged pt to drink at least 48 oz-64 oz (6-8 cups) per day.     Answered patient's questions. Patient verbalized understanding of education provided.     Educational Materials Provided:   Health Daily Nutrition Plate method handout and [facility] fiber content of food and mediterranean nutrition therapy    Goals:  Can use the Plate method plan for general guidance on getting balanced meals     Make half of your plate vegetables and fruit.      Make 1/4 of your plate lean protein sources at a meal (salmon/fish, skinless chicken/turkey breast, pork tenderloin, lean cuts of beef, black or kidney or tuttle beans, tofu, edamame, tempeh, eggs, egg whites, lowfat cottage cheese, lowfat yogurt).     Make the other 1/4 of the plate whole grain starches/grains/starchy vegetables at meals. Some examples include quinoa, brown rice, wild rice, barley, whole grain tortilla or starchy vegetables (potatoes, sweet potatoes, winter squash, peas, corn).     Include some healthy/unsaturated fat servings at a meal (avocados, nut butters, nuts/seeds, olive oil, vegetable oils, flaxseed, den seeds).    2.  Include good sources of soluble fiber in your diet such as oats, barley, apple, pears, oranges, avocado, berries, beans/legumes, den seeds, flaxseeds, brussels sprouts, carrots, banana, nuts (especially almonds), figs.    --a few  ideas to increase soluble fiber could be to add some den seeds or flaxseeds to your morning yogurt meals    --could add slices of avocado onto your salad at lunch    --can have some nuts for an afternoon snack along with some vegetables or fruit    3.  Continue to drink at least 48 oz-64 oz water per day.    4.  On days when having alcoholic beverages aim for having 2 standard drinks or less on those days.  1 standard drink is equal to 5 oz wine, 12 oz beer or one and a half ounce hard liquor.    Nutrition Monitoring and Evaluation: Will monitor adherence to nutrition recommendations at future RD visits.     Further Medical Nutrition Therapy:  Annual visits    Patient was encouraged to call/contact RD with any further questions.    Lindsey Olivia MS, RD, LD

## 2024-02-07 NOTE — PROGRESS NOTES
AUDIOLOGY REPORT    [facility] Assessment    SUMMARY: Audiology visit completed. See audiogram for results.      RECOMMENDATIONS: Follow-up with Dr. Ricci. Return to monitor hearing if changes are noted or new ear symptoms arise.     Pavel Marina.  Licensed Audiologist  [place] # [id number]

## 2024-02-07 NOTE — NURSING NOTE
AHA BP    1st   133/91  2nd  144/96  3rd   146/91    Average   141/93  Minnie Hassan CMA 2:16 PM on 2/7/2024

## 2024-02-07 NOTE — LETTER
"2/7/2024       RE: Jean-Claude Bose  [address]  [place] [address]     Dear Colleague,    Thank you for referring your patient, Jean-Claude Bose, to the [facility] [place] at [facility]. Please see a copy of my visit note below.    History and Physical Examination     SUBJECTIVE: Chief concern: preventive health review.     Past Medical History:   1.  History of urine dipstick positive for blood with negative microscopy; urology consultant recommended annual testing with further evaluation only if significant microscopic hematuria noted  2.  Status post right ankle tendon surgery, circa 2003  3.  History of unspecified \"innocent\" murmur with reportedly benign features on echocardiogram  4.  Status post right posterior horn meniscus tear, status post arthroscopic partial medial meniscectomy, 4/2022  5.  History of mild situational anxiety with episodic hyperhidrosis     Adverse Drug Reactions: Sulfa (delirium)     Current Medications:  Glycopyrrolate, 1 mg twice daily as needed for hyperhidrosis     Habits:  Tobacco: Never  Alcohol: 3-5 servings, 1-2 days/week  Caffeine: 2-3 servings of coffee per day  Cannabis/Street drugs: None     Social History:  to Armen Torres father of 2 sons: Guerrero, age 5, who enjoys swimming, tennis, and soccer; and Bonifacio, age nearly 3, who is also very active.  Jean-Claude is a native of [place] who graduated from [employer] and [employer], where he competed in tennis and swimming.  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  Away from work, he enjoys spending time with his family, visiting a [place] in [place], cooking, and fine dieting.  He " exercises daily for 60-90 minutes, typically alternating among running, biking, strength training and elliptical workouts, typically 60 minutes of aerobic exercise and 15 minutes of strength training.     Family History: Father  from myocardial infarction at age 72 attributed to complications of pulmonary fibrosis, possibly related to underlying rheumatoid arthritis.  Mother is 72, with history of cataracts.  A brother  at age 28 from Duchenne muscular dystrophy, determined to be due to a nonhereditary spontaneous mutation.  A sister 4 years his joyce is in good health.  Sons are in good health; Armen is there biological father with a surrogate mother.  Paternal grandfather  at age 78 from tobacco-related lung disease, presumably COPD.  Paternal grandmother  at age 90.  Maternal grandfather  in his 60s from myocardial infarction.  Maternal grandmother  in her late 80s, with history of dementia diagnosed at advanced age.     Review of Systems: Occasional snoring, without daytime somnolence; Jean-Claude indicates that he feels relatively refreshed upon awakening in the morning.  COVID (Pfizer) vaccinations administered on 4/3, , and 2021 and 2022; COVID (Moderna) vaccination administered on 2023.  Most recent tetanus (Td) vaccination administered in 10/2019; Tdap administered in 10/2018.  Typhoid IM administered in 2019 and 2008.  Hepatitis A/hepatitis B vaccination series was completed in 3/1997.  Influenza vaccination was administered earlier this season.  Remainder of complete review of systems was negative.     OBJECTIVE:     Vital signs: Height 68 inches.  Weight 148 pounds.  Blood pressure 141/93 on average of 3 automated readings.  Heart rate 73.  Respiratory rate 18.  Temperature 97.8 degrees.  O2 saturation 100% on room air.  General: Alert, neatly dressed and groomed, in no acute distress.  HEENT: Atraumatic and normocephalic. Eyelids, pupils, and conjunctivae  "appeared normal. Lips, teeth and gums appear normal.  Oropharynx showed moist mucous membranes, without exudate or erythema.  Slightly \"crowded\" soft palate with mildly narrowed airway.  Neck: Supple, without thyromegaly, mass, or bruit. No cervical or supraclavicular lymphadenopathy.  Back: No spinal or costovertebral angle tenderness.  Chest: Clear to auscultation and percussion. Normal respiratory effort.  Cardiovascular: No jugular venous distention. Regular rate and rhythm, normal S1, S2 with possible I-II/VI systolic murmur with brisk carotid upstrokes.  Abdomen: Bowel sounds positive; soft, nontender, without rebound, guarding, hepatosplenomegaly or mass.  Extremities: No cyanosis or edema.  Genitalia: Normal male genitalia, without scrotal mass or hernia. No inguinal lymphadenopathy.  Rectal: Normal tone, with smooth, nontender, nonenlarged prostate. No rectal mass.  Skin: Examination was inadvertently omitted; dermatologist was unavailable for scheduled appointment due to illness.  Neurologic: Cranial nerves II-XII were grossly intact. Sensory and motor examinations were normal. Normal gait.  Mini-cog score was 5/5.  Psychiatric: Alert and oriented ×3. Normal affect. Judgment and insight intact.  PHQ-2 score was 0.  HOUSTON-7 score was 1.    Creatinine 0.77, alkaline phosphatase 64, ALT 25, cholesterol 289, triglycerides 48, , , cholesterol/HDL 2.2, glucose 108, TSH 2.08, 25-hydroxy vitamin D 44, white blood cell count 5000, hemoglobin 15.2, platelets 229,000, HIV nonreactive, urinalysis showed specific gravity 1.002, small blood, 1 RBC and <1 WBC/hpf.  Hepatitis C antibody was nonreactive in 7/2021.    EKG showed normal sinus rhythm with voltage criteria for left ventricular hypertrophy.  Otherwise unremarkable.  Spirometry showed an FEV1 of 4.25, with an FVC of 5.93; readings were 116% and 132% of predicted values, respectively.    An audiology evaluation showed normal hearing " bilaterally.    Preliminary DEXA showed normal bone density.  Body composition analysis showed 13.9% fat (5th percentile).  Body mass index was 22.5.     ASSESSMENT:    1.  Snoring with narrow airway.  No daytime somnolence or unrefreshed sensation upon awakening.  We agreed that Jean-Claude would ask Armen to monitor his breathing during sleep.  He agrees to pursue sleep clinic consultation if loud snoring, gasping, or pauses in breathing are observed, or in the event that he develops daytime somnolence.    2.  Abnormal EKG.  Jean-Claude appears to be physically fit and I suspect this is a false reading reflecting his superior cardiorespiratory fitness.  We agreed that he would proceed with an echocardiogram to exclude true left ventricular hypertrophy.  Further recommendations will be based on this result.    3.  Low body fat percentage.  Jean-Claude was congratulated for his impressive regimen of regular exercise.  We reviewed the debate regarding the benefit of extremely low body fat.  I suggested that he attempt to increase his calorie intake given his rigorous regimen of physical activity.    4.  Low urine specific gravity.  Minimally depressed relative to normal range.  He indicates that he consumes copious amounts of water.  I recommend that he curtail his water intake slightly to avoid over hydration.  We discussed the importance of including electrolytes when exercising during periods of warm weather or excessive perspiration.    5.  Preventive care.  Using AHA PREVENT algorithm in setting his HDL level at 100 (highest level allowed for use of the algorithm), his estimated 10- and 30-year risks of CVD, ASCVD, and heart failure are 1.3%/9.8%, 0.8%/5.8%, and 0.8% / 6.1%, respectively.  We discussed the favorable nature of these results and I indicated that I would investigate whether further evaluation of his extremely high HDL cholesterol level would be warranted.  We reviewed basic strategies for managing stress,  which is currently reasonably controlled; he agrees to contact me if he wishes to pursue counseling for further management.  I recommended that he limit consumption of alcohol to no more than 1 (occasionally 2) servings per day.  I will recommend use of a vitamin D3 supplement, 50 mcg daily, while maintaining daily calcium intake of 1200 mg, preferably from dietary sources.  He will be advised of the debate regarding the benefit of multivitamin supplements and the importance of selecting product that does not contain iron should he choose to proceed.  We reviewed the elements of a healthful diet.  He will be advised of the roles of various types of exercise, putting the time economy associate with high-intensity interval training.     PLAN: See above.     ~SRT      Again, thank you for allowing me to participate in the care of your patient.      Sincerely,    Dano Ricci MD

## 2024-02-07 NOTE — OUTPATIENT NURSE NOTE
"   02/07/24 0941   Fitness   Current Fitness Regimen:  Exercise: 7 d/wk total: weekdays ~50 min cardio + 20 min core or strength. Weekends: ~9 mi run. Physical activity: ~20 min lunch walks, light activity with kids.   Fitness Goals Assessment, sticking to routine with age, how not to overdo it   Timeline   Recommended Activity this Week Continue current routine, but lift to \"failure minus 2\" during weight training. See instructions for details.   Recommended Minutes per Day this Week 70 Min   Recommended Number of Days this Week 7 Per Day/Per Week   Recommended Activity this Month As above, but add leg exercise to weight training routine 1-2 d/wk.   Recommended Duration this Month 70 Min/Hrs   Recommended Frequency this Month 7 Per Day/Per Week   Recommended Activity the Nex 3 Months As above   VO2 Max   VO2MAX:  62.3 ml/kg/min   VO2- max Percentile 99 %   Fitness Level Superior    Strength    Strength (Right):  82 ml/kg/min    Strength (Left) 90 ml/kg/min       "

## 2024-02-07 NOTE — PROGRESS NOTES
Jean-Claude Bose comes into clinic today at the request of Dr. MADIE Ricci Ordering Provider for EKG.    This service provided today was under the supervising provider of the day Dr. MADIE Ricci, who was available if needed.    Minnie Elise, [facility]

## 2024-02-07 NOTE — PROGRESS NOTES
History  HPI       Blurred Vision Evaluation    In both eyes.  Context:  distance vision.  Associated symptoms include Negative for redness, tearing, headache, flashes, floaters and burning.  Treatments tried include glasses.  Response to treatment was issue resolved.             Comments    Patient currently wears glasses as needed - for nighttime driving and concerts. No blurry vision with current glasses. Takes off to read. Denies burning, redness, headaches, tearing, and itching. No floaters and flashes. No family history for glaucoma and AMD. Mom had cataract surgery at 71 y/o.           Last edited by Juana Torre on 2/7/2024  8:14 AM.          Assessment/Plan  (H52.13) Myopia of both eyes  (primary encounter diagnosis)  Comment: Myopia right eye>left eye, wears glasses as needed for distance  Plan: REFRACTION         Educated patient on condition and clinical findings. Dispensed spectacle prescription for as-needed wear. Monitor annually.    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 Monitor annually.    Return to clinic in 1 year for comprehensive eye exam.    Complete documentation of historical and exam elements from today's encounter can  be found in the full encounter summary report (not reduplicated in this progress  note). I personally obtained the chief complaint(s) and history of present illness. I  confirmed and edited as necessary the review of systems, past medical/surgical  history, family history, social history, and examination findings as documented by  others; and I examined the patient myself. I personally reviewed the relevant tests,  images, and reports as documented above. I formulated and edited as necessary the  assessment and plan and discussed the findings and management plan with the  patient and family.    Kip Arriaga, MANNY, FAAO

## 2024-02-07 NOTE — NURSING NOTE
Chief Complaint   Patient presents with    Physical     Patient is here for annual physical     Minnie Hassan CMA 9:16 AM on 2/7/2024

## 2024-02-07 NOTE — LETTER
"    2/7/2024         RE: Jean-Claude Bose  [address]  [place] [address]        Dear Colleague,    Thank you for referring your patient, Jean-Claude Bose, to the [facility] [place]. Please see a copy of my visit note below.    [facility] Outpatient Medical Nutrition Therapy      Time Spent:  33 minutes  Session Type:  Initial  Referral Source: [employer]/Dr. Dano Ricci  Reason for RD Visit:   Nutritional counseling     Nutrition Assessment:    Patient is a 43 y.o. male who is here for initial annual visit with Registered Dietitian (RD).    He stated that overall he eats a general healthful diet. He limits red meat and eats a lot of salad and vegetables. He eats 3 meals per day and typically has a snack about  4 PM. Sometimes will have additional snacks and find that he snacks more on days when working from home versus in the office. He is trying to eat a diet that promotes good cholesterol levels. Overall he is interested in diet education for general healthful diet today with any tips and suggestions for following a healthy diet.    Patient Active Problem List   Diagnosis    Urine Tests Nonspecific Abnormal Findings    Functional Murmur    Microscopic Hematuria     Estimated body mass index is 22.5 kg/m  as calculated from the following:    Height as of an earlier encounter on 2/7/24: 1.727 m (5' 8\").    Weight as of an earlier encounter on 2/7/24: 67.1 kg (148 lb).    Diet Recall:  (some usual meals, snacks and beverages):  Meal Food    Breakfast 1/2 granola bar before working out and then at 9 AM: greek yogurt with granola or RX bar   Lunch Salad with protein (HB eggs/tuna/salmon/chicken and sometimes side of pretzels/veggies with dip   Dinner Turkey burger and salad or salmon with green beans or cauliflower rice with veg stir kraus and chicken/shrimp with low sodium sauce   Snacks 4 PM: nuts or pretzels and dips or corn puffs or " veggies and guac. On weekends cheese and crackers. In evening tries not to snack but if does similar to other snacks    Beverages 48 oz+ water, 1 can sparkling water per day, 3 cups black coffee   Alcohol Intake Friday, Sat and Sun: has 3-4 drinks (wine, saúl/cocktail or beer). Occas has 1 drink durng the week.     Frequency of eating/taking out meals: about 2 times per week on weekends.     Labs:  on 2/7/24, Chol 289, LDL chol 143, non HDL chol 153.    Last Comprehensive Metabolic Panel:  Sodium   Date Value Ref Range Status   12/19/2022 137 136 - 145 mmol/L Final     Potassium   Date Value Ref Range Status   12/19/2022 4.5 3.4 - 5.3 mmol/L Final   07/14/2021 4.6 3.5 - 5.0 mmol/L Final     Chloride   Date Value Ref Range Status   12/19/2022 99 98 - 107 mmol/L Final   07/14/2021 101 98 - 107 mmol/L Final     Carbon Dioxide (CO2)   Date Value Ref Range Status   12/19/2022 28 22 - 29 mmol/L Final   07/14/2021 23 22 - 31 mmol/L Final     Anion Gap   Date Value Ref Range Status   12/19/2022 10 7 - 15 mmol/L Final   07/14/2021 15 5 - 18 mmol/L Final     Glucose   Date Value Ref Range Status   02/07/2024 108 (H) 70 - 99 mg/dL Final   07/14/2021 75 70 - 125 mg/dL Final     Urea Nitrogen   Date Value Ref Range Status   12/19/2022 12.4 6.0 - 20.0 mg/dL Final   07/14/2021 13 8 - 22 mg/dL Final     Creatinine   Date Value Ref Range Status   02/07/2024 0.77 0.67 - 1.17 mg/dL Final     GFR Estimate   Date Value Ref Range Status   02/07/2024 >90 >60 mL/min/1.73m2 Final   10/15/2019 >60 >60 mL/min/1.73m2 Final     Calcium   Date Value Ref Range Status   12/19/2022 9.8 8.6 - 10.0 mg/dL Final     CBC RESULTS:   Recent Labs   Lab Test 12/19/22  1019   WBC 7.2   RBC 4.61   HGB 14.9   HCT 43.9   MCV 95   MCH 32.3   MCHC 33.9   RDW 11.7        Pertinent Medications/vitamin and mineral supplements:     Current Outpatient Medications   Medication    glycopyrrolate (ROBINUL) 1 MG tablet     No current facility-administered  medications for this visit.     Food Allergies:  NKFA    Physical Activity:  works out about 70 minutes, 7 days per week. (55 minutes of cardio and 10-15 mins of upper body lifting)    Nutrition Prescription: Recommended general healthful diet     Nutrition Intervention:    Nutrition Education/Counseling:  -Provided general healthful diet nutrition education with tips and suggestions.   -Reviewed the Plate method meal plan with food groups and some example foods in groups.   -Discussed heart healthy diet tips.  -Reviewed the difference between unsaturated and saturated fats with recommendation to aim for choosing unsaturated fat over saturated fats and discussed examples of both.  -Encouraged patient to eat adequate fruit and vegetable servings per day (at least 5 servings but explained recommendation to aim for 9-11 servings per day).   -Discussed adequate hydration/recommendations and encouraged pt to drink at least 48 oz-64 oz (6-8 cups) per day.     Answered patient's questions. Patient verbalized understanding of education provided.     Educational Materials Provided:  [facility] Daily Nutrition Plate method handout and [place] fiber content of food and mediterranean nutrition therapy    Goals:  Can use the Plate method plan for general guidance on getting balanced meals     Make half of your plate vegetables and fruit.      Make 1/4 of your plate lean protein sources at a meal (salmon/fish, skinless chicken/turkey breast, pork tenderloin, lean cuts of beef, black or kidney or tuttle beans, tofu, edamame, tempeh, eggs, egg whites, lowfat cottage cheese, lowfat yogurt).     Make the other 1/4 of the plate whole grain starches/grains/starchy vegetables at meals. Some examples include quinoa, brown rice, wild rice, barley, whole grain tortilla or starchy vegetables (potatoes, sweet potatoes, winter squash, peas, corn).     Include some healthy/unsaturated fat servings at a meal (avocados, nut butters, nuts/seeds, olive  oil, vegetable oils, flaxseed, den seeds).    2.  Include good sources of soluble fiber in your diet such as oats, barley, apple, pears, oranges, avocado, berries, beans/legumes, den seeds, flaxseeds, brussels sprouts, carrots, banana, nuts (especially almonds), figs.    --a few ideas to increase soluble fiber could be to add some den seeds or flaxseeds to your morning yogurt meals    --could add slices of avocado onto your salad at lunch    --can have some nuts for an afternoon snack along with some vegetables or fruit    3.  Continue to drink at least 48 oz-64 oz water per day.    4.  On days when having alcoholic beverages aim for having 2 standard drinks or less on those days.  1 standard drink is equal to 5 oz wine, 12 oz beer or one and a half ounce hard liquor.    Nutrition Monitoring and Evaluation: Will monitor adherence to nutrition recommendations at future RD visits.     Further Medical Nutrition Therapy:  Annual visits    Patient was encouraged to call/contact RD with any further questions.      Again, thank you for allowing me to participate in the care of your patient.      Sincerely,    Lindsey Olivia RD

## 2024-02-08 LAB
ATRIAL RATE - MUSE: 75 BPM
CRP SERPL HS-MCNC: <0.15 MG/L
DIASTOLIC BLOOD PRESSURE - MUSE: NORMAL MMHG
INTERPRETATION ECG - MUSE: NORMAL
P AXIS - MUSE: 76 DEGREES
PR INTERVAL - MUSE: 158 MS
QRS DURATION - MUSE: 94 MS
QT - MUSE: 392 MS
QTC - MUSE: 437 MS
R AXIS - MUSE: 58 DEGREES
SYSTOLIC BLOOD PRESSURE - MUSE: NORMAL MMHG
T AXIS - MUSE: 51 DEGREES
VENTRICULAR RATE- MUSE: 75 BPM

## 2024-02-08 NOTE — PROGRESS NOTES
"History and Physical Examination     SUBJECTIVE: Chief concern: preventive health review.     Past Medical History:   1.  History of urine dipstick positive for blood with negative microscopy; urology consultant recommended annual testing with further evaluation only if significant microscopic hematuria noted  2.  Status post right ankle tendon surgery, circa   3.  History of unspecified \"innocent\" murmur with reportedly benign features on echocardiogram  4.  Status post right posterior horn meniscus tear, status post arthroscopic partial medial meniscectomy, 2022  5.  History of mild situational anxiety with episodic hyperhidrosis     Adverse Drug Reactions: Sulfa (delirium)     Current Medications:  Glycopyrrolate, 1 mg twice daily as needed for hyperhidrosis     Habits:  Tobacco: Never  Alcohol: 3-5 servings, 1-2 days/week  Caffeine: 2-3 servings of coffee per day  Cannabis/Street drugs: None     Social History:  to Armen Torres father of 2 sons: Guerrero, age 5, who enjoys swimming, tennis, and soccer; and Bonifacio, age nearly 3, who is also very active.  Jean-Claude is a native of [place] who graduated from [employer] and [employer], where he competed in tennis and swimming.  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  Away from work, he enjoys spending time with his family, visiting a [place] in [place], cooking, and fine dieting.  He exercises daily for 60-90 minutes, typically alternating among running, biking, strength training and elliptical workouts, typically 60 minutes of aerobic exercise and 15 minutes of strength training.     Family History: Father  from myocardial infarction at age 72 attributed to complications of pulmonary fibrosis, possibly related to " "underlying rheumatoid arthritis.  Mother is 72, with history of cataracts.  A brother  at age 28 from Duchenne muscular dystrophy, determined to be due to a nonhereditary spontaneous mutation.  A sister 4 years his joyce is in good health.  Sons are in good health; Armen is there biological father with a surrogate mother.  Paternal grandfather  at age 78 from tobacco-related lung disease, presumably COPD.  Paternal grandmother  at age 90.  Maternal grandfather  in his 60s from myocardial infarction.  Maternal grandmother  in her late 80s, with history of dementia diagnosed at advanced age.     Review of Systems: Occasional snoring, without daytime somnolence; Jean-Claude indicates that he feels relatively refreshed upon awakening in the morning.  COVID (Pfizer) vaccinations administered on 4/3, , and 2021 and 2022; COVID (Moderna) vaccination administered on 2023.  Most recent tetanus (Td) vaccination administered in 10/2019; Tdap administered in 10/2018.  Typhoid IM administered in 2019 and 2008.  Hepatitis A/hepatitis B vaccination series was completed in 3/1997.  Influenza vaccination was administered earlier this season.  Remainder of complete review of systems was negative.     OBJECTIVE:     Vital signs: Height 68 inches.  Weight 148 pounds.  Blood pressure 141/93 on average of 3 automated readings.  Heart rate 73.  Respiratory rate 18.  Temperature 97.8 degrees.  O2 saturation 100% on room air.  General: Alert, neatly dressed and groomed, in no acute distress.  HEENT: Atraumatic and normocephalic. Eyelids, pupils, and conjunctivae appeared normal. Lips, teeth and gums appear normal.  Oropharynx showed moist mucous membranes, without exudate or erythema.  Slightly \"crowded\" soft palate with mildly narrowed airway.  Neck: Supple, without thyromegaly, mass, or bruit. No cervical or supraclavicular lymphadenopathy.  Back: No spinal or costovertebral angle " tenderness.  Chest: Clear to auscultation and percussion. Normal respiratory effort.  Cardiovascular: No jugular venous distention. Regular rate and rhythm, normal S1, S2 with possible I-II/VI systolic murmur with brisk carotid upstrokes.  Abdomen: Bowel sounds positive; soft, nontender, without rebound, guarding, hepatosplenomegaly or mass.  Extremities: No cyanosis or edema.  Genitalia: Normal male genitalia, without scrotal mass or hernia. No inguinal lymphadenopathy.  Rectal: Normal tone, with smooth, nontender, nonenlarged prostate. No rectal mass.  Skin: Examination was inadvertently omitted; dermatologist was unavailable for scheduled appointment due to illness.  Neurologic: Cranial nerves II-XII were grossly intact. Sensory and motor examinations were normal. Normal gait.  Mini-cog score was 5/5.  Psychiatric: Alert and oriented ×3. Normal affect. Judgment and insight intact.  PHQ-2 score was 0.  HOUSTON-7 score was 1.    Creatinine 0.77, alkaline phosphatase 64, ALT 25, cholesterol 289, triglycerides 48, , , cholesterol/HDL 2.2, glucose 108, TSH 2.08, 25-hydroxy vitamin D 44, white blood cell count 5000, hemoglobin 15.2, platelets 229,000, HIV nonreactive, urinalysis showed specific gravity 1.002, small blood, 1 RBC and <1 WBC/hpf.  Hepatitis C antibody was nonreactive in 7/2021.    EKG showed normal sinus rhythm with voltage criteria for left ventricular hypertrophy.  Otherwise unremarkable.  Spirometry showed an FEV1 of 4.25, with an FVC of 5.93; readings were 116% and 132% of predicted values, respectively.    An audiology evaluation showed normal hearing bilaterally.    Preliminary DEXA showed normal bone density.  Body composition analysis showed 13.9% fat (5th percentile).  Body mass index was 22.5.     ASSESSMENT:    1.  Snoring with narrow airway.  No daytime somnolence or unrefreshed sensation upon awakening.  We agreed that Jean-Claude would ask Armen to monitor his breathing during sleep.   He agrees to pursue sleep clinic consultation if loud snoring, gasping, or pauses in breathing are observed, or in the event that he develops daytime somnolence.    2.  Abnormal EKG.  Jean-Claude appears to be physically fit and I suspect this is a false reading reflecting his superior cardiorespiratory fitness.  We agreed that he would proceed with an echocardiogram to exclude true left ventricular hypertrophy.  Further recommendations will be based on this result.    3.  Low body fat percentage.  Jean-Claude was congratulated for his impressive regimen of regular exercise.  We reviewed the debate regarding the benefit of extremely low body fat.  I suggested that he attempt to increase his calorie intake given his rigorous regimen of physical activity.    4.  Low urine specific gravity.  Minimally depressed relative to normal range.  He indicates that he consumes copious amounts of water.  I recommend that he curtail his water intake slightly to avoid over hydration.  We discussed the importance of including electrolytes when exercising during periods of warm weather or excessive perspiration.    5.  Preventive care.  Using AHA PREVENT algorithm in setting his HDL level at 100 (highest level allowed for use of the algorithm), his estimated 10- and 30-year risks of CVD, ASCVD, and heart failure are 1.3%/9.8%, 0.8%/5.8%, and 0.8% / 6.1%, respectively.  We discussed the favorable nature of these results and I indicated that I would investigate whether further evaluation of his extremely high HDL cholesterol level would be warranted.  We reviewed basic strategies for managing stress, which is currently reasonably controlled; he agrees to contact me if he wishes to pursue counseling for further management.  I recommended that he limit consumption of alcohol to no more than 1 (occasionally 2) servings per day.  I will recommend use of a vitamin D3 supplement, 50 mcg daily, while maintaining daily calcium intake of 1200 mg,  preferably from dietary sources.  He will be advised of the debate regarding the benefit of multivitamin supplements and the importance of selecting product that does not contain iron should he choose to proceed.  We reviewed the elements of a healthful diet.  He will be advised of the roles of various types of exercise, putting the time economy associate with high-intensity interval training.     PLAN: See above.     ~SRT

## 2024-02-09 LAB
EXPTIME-PRE: 9.73 SEC
FEF2575-%PRED-PRE: 80 %
FEF2575-PRE: 2.93 L/SEC
FEF2575-PRED: 3.64 L/SEC
FEFMAX-%PRED-PRE: 116 %
FEFMAX-PRE: 11.25 L/SEC
FEFMAX-PRED: 9.69 L/SEC
FEV1-%PRED-PRE: 116 %
FEV1-PRE: 4.25 L
FEV1FEV6-PRE: 72 %
FEV1FEV6-PRED: 82 %
FEV1FVC-PRE: 72 %
FEV1FVC-PRED: 82 %
FIFMAX-PRE: 9.36 L/SEC
FVC-%PRED-PRE: 132 %
FVC-PRE: 5.93 L
FVC-PRED: 4.48 L

## 2024-02-22 ENCOUNTER — TELEPHONE (OUTPATIENT)
Dept: DERMATOLOGY | Facility: CLINIC | Age: 44
End: 2024-02-22
Payer: COMMERCIAL

## 2024-02-22 NOTE — TELEPHONE ENCOUNTER
[facility] Call Center    Phone Message    May a detailed message be left on voicemail: yes     Reason for Call: Other: Pt is returning a call to reschedule the [facility] SIGNATURE BASE ASSESSMENT [[id number]] that is set for tomorrow.      Writer is unable to schedule for this visit type.        Please call back as soon as possible. Thank you.      Action Taken: Message routed to:  [facility] Derm    Travel Screening: Not Applicable

## 2024-02-22 NOTE — TELEPHONE ENCOUNTER
Left Voicemail (1st Attempt) for the patient to call back and reschedule the following:    Appointment type: SBA  Provider: Dr. Vasquez  Return date: 2/23/24  Specialty phone number: [phone]

## 2024-02-22 NOTE — TELEPHONE ENCOUNTER
Patient Contacted  and schedule the following:    Appointment type: SBA  Provider: Dr. Vasquez  Return date: 3/15/24  Specialty phone number: [phone]

## 2024-02-29 ENCOUNTER — ANCILLARY PROCEDURE (OUTPATIENT)
Dept: CARDIOLOGY | Facility: CLINIC | Age: 44
End: 2024-02-29
Attending: INTERNAL MEDICINE
Payer: COMMERCIAL

## 2024-02-29 DIAGNOSIS — R94.31 NONSPECIFIC ABNORMAL ELECTROCARDIOGRAM (ECG) (EKG): ICD-10-CM

## 2024-02-29 LAB
BI-PLANE LVEF ECHO: NORMAL
LVEF ECHO: NORMAL

## 2024-02-29 PROCEDURE — 93306 TTE W/DOPPLER COMPLETE: CPT | Performed by: INTERNAL MEDICINE

## 2024-03-03 DIAGNOSIS — R61 EXCESSIVE SWEATING: ICD-10-CM

## 2024-03-04 RX ORDER — GLYCOPYRROLATE 1 MG/1
TABLET ORAL
Qty: 180 TABLET | Refills: 1 | Status: SHIPPED | OUTPATIENT
Start: 2024-03-04

## 2024-03-15 ENCOUNTER — OFFICE VISIT (OUTPATIENT)
Dept: DERMATOLOGY | Facility: CLINIC | Age: 44
End: 2024-03-15
Payer: COMMERCIAL

## 2024-03-15 DIAGNOSIS — L82.1 SEBORRHEIC KERATOSIS: ICD-10-CM

## 2024-03-15 DIAGNOSIS — D18.01 CHERRY ANGIOMA: ICD-10-CM

## 2024-03-15 DIAGNOSIS — D22.9 MULTIPLE BENIGN NEVI: Primary | ICD-10-CM

## 2024-03-15 DIAGNOSIS — L81.4 LENTIGINES: ICD-10-CM

## 2024-03-15 PROCEDURE — 99203 OFFICE O/P NEW LOW 30 MIN: CPT | Performed by: DERMATOLOGY

## 2024-03-15 ASSESSMENT — PAIN SCALES - GENERAL: PAINLEVEL: NO PAIN (0)

## 2024-03-15 NOTE — PROGRESS NOTES
[facility] Dermatology Note  Encounter Date: Mar 15, 2024  Office Visit     Dermatology Problem List:  #Lesion to monitor on left lower abdomen. Favor benign nevus with eccentric pigment.    # SHx: Used to be a swimmer and played tennis ball. Has 3 and 5 year old boys. Works at [employer].  # FHx: Unaware of any skin cancer in family    Patient previously seen by [facility] in [place]. Had bx on back that was reported to be benign. Will work on obtaining previous records.   ____________________________________________    Assessment & Plan:    # Lesion to monitor, left lower abdomen. Favor benign nevus with eccentric pigment.  - Continue to monitor. Recheck at next visit.    # Benign lesions - SKs, cherry angiomas, lentigenes.  - No treatment required    # Multiple benign nevi.   - Monitor for ABCDEs of melanoma   - Continue sun protection - recommend SPF 30 or higher with frequent application   - Return sooner if noticing changing or symptomatic lesions  - try to obtain outside bx records    Procedures Performed:   None.    Follow-up: 1 year(s) in-person, or earlier for new or changing lesions    Staff and Scribe:     Scribe Disclosure:   I, SUDARSHAN ALEX, am serving as a scribe; to document services personally performed by Rehana Vasquez MD -based on data collection and the provider's statements to me.    Provider Disclosure:   The documentation recorded by the scribe accurately reflects the services I personally performed and the decisions made by me.    Rehana Vasquez MD    Department of Dermatology  [facility]: Phone: [phone], Fax: [phone]  3/15/2024     ____________________________________________    CC: Skin Check (FBSE/No areas of concern)    HPI:  Mr. Jean-Claude Bose is a(n) 43 year old male who presents today as a new patient for FBSE.    The patient has no new  concerns. Nothing bleeding, crusting, changing, or bleeding.    He is unaware of any family hx of skin cancer. The patient mentions he used to be a swimmer and played tennis ball.     Previously [facility] in [place]. Had benign bx on back.    Patient is otherwise feeling well, without additional skin concerns.    Labs Reviewed:  N/A    Physical Exam:  Vitals: There were no vitals taken for this visit.  SKIN: Total skin excluding the undergarment areas was performed. The exam included the head/face, neck, both arms, chest, back, abdomen, both legs, digits and/or nails.   - Left lower abdomen, even light brown macule with eccentric medium brown pigment.  - There are dome shaped bright red papules on the trunk and extremities .   - Multiple regular brown pigmented macules and papules are identified on the trunk and extremities.   - Scattered brown macules on sun exposed areas.  - Waxy stuck on papules and plaques on trunk and extremities.   - No other lesions of concern on areas examined.     Medications:  Current Outpatient Medications   Medication    glycopyrrolate (ROBINUL) 1 MG tablet     No current facility-administered medications for this visit.      Past Medical History:   Patient Active Problem List   Diagnosis    Urine Tests Nonspecific Abnormal Findings    Functional Murmur    Microscopic Hematuria     Past Medical History:   Diagnosis Date    Gastroesophageal reflux disease     Uncomplicated asthma     Athletic induced when younger        CC No referring provider defined for this encounter. on close of this encounter.

## 2024-03-15 NOTE — LETTER
3/15/2024       RE: Jean-Claude Bose  [address]  [place] [address]     Dear Colleague,    Thank you for referring your patient, Jean-Claude Bose, to the [facility] [place] at [facility]. Please see a copy of my visit note below.    [facility] Dermatology Note  Encounter Date: Mar 15, 2024  Office Visit     Dermatology Problem List:  #Lesion to monitor on left lower abdomen. Favor benign nevus with eccentric pigment.    # SHx: Used to be a swimmer and played tennis ball. Has 3 and 5 year old boys. Works at [employer].  # FHx: Unaware of any skin cancer in family    Patient previously seen by [facility] in [place]. Had bx on back that was reported to be benign. Will work on obtaining previous records.   ____________________________________________    Assessment & Plan:    # Lesion to monitor, left lower abdomen. Favor benign nevus with eccentric pigment.  - Continue to monitor. Recheck at next visit.    # Benign lesions - SKs, cherry angiomas, lentigenes.  - No treatment required    # Multiple benign nevi.   - Monitor for ABCDEs of melanoma   - Continue sun protection - recommend SPF 30 or higher with frequent application   - Return sooner if noticing changing or symptomatic lesions  - try to obtain outside bx records    Procedures Performed:   None.    Follow-up: 1 year(s) in-person, or earlier for new or changing lesions    Staff and Scribe:     Scribe Disclosure:   I, SUDARSHAN ALEX, am serving as a scribe; to document services personally performed by Rehana Vasquez MD -based on data collection and the provider's statements to me.    Provider Disclosure:   The documentation recorded by the scribe accurately reflects the services I personally performed and the decisions made by me.    Rehana Vasquez MD    Department of Dermatology  [facility]  [facility]: Phone: [phone], Fax: [phone]  3/15/2024     ____________________________________________    CC: Skin Check (FBSE/No areas of concern)    HPI:  Mr. Jean-Claude Bose is a(n) 43 year old male who presents today as a new patient for FBSE.    The patient has no new concerns. Nothing bleeding, crusting, changing, or bleeding.    He is unaware of any family hx of skin cancer. The patient mentions he used to be a swimmer and played tennis ball.     Previously seen by [facility] in [place]. Had benign bx on back.    Patient is otherwise feeling well, without additional skin concerns.    Labs Reviewed:  N/A    Physical Exam:  Vitals: There were no vitals taken for this visit.  SKIN: Total skin excluding the undergarment areas was performed. The exam included the head/face, neck, both arms, chest, back, abdomen, both legs, digits and/or nails.   - Left lower abdomen, even light brown macule with eccentric medium brown pigment.  - There are dome shaped bright red papules on the trunk and extremities .   - Multiple regular brown pigmented macules and papules are identified on the trunk and extremities.   - Scattered brown macules on sun exposed areas.  - Waxy stuck on papules and plaques on trunk and extremities.   - No other lesions of concern on areas examined.     Medications:  Current Outpatient Medications   Medication    glycopyrrolate (ROBINUL) 1 MG tablet     No current facility-administered medications for this visit.      Past Medical History:   Patient Active Problem List   Diagnosis    Urine Tests Nonspecific Abnormal Findings    Functional Murmur    Microscopic Hematuria     Past Medical History:   Diagnosis Date    Gastroesophageal reflux disease     Uncomplicated asthma     Athletic induced when younger        CC No referring provider defined for this encounter. on close of this encounter.

## 2024-03-15 NOTE — PATIENT INSTRUCTIONS

## 2024-03-15 NOTE — NURSING NOTE
Dermatology Rooming Note    Jean-Claude Bose's goals for this visit include:   Chief Complaint   Patient presents with    Skin Check     FBSE  No areas of concern     Magdalene Mims LPN

## 2024-03-15 NOTE — Clinical Note
I forgot to have pt sign VANESSA but could we please try to get records from [facility] in [facility] (prior bx results)? Thanks!

## 2024-03-20 ENCOUNTER — TELEPHONE (OUTPATIENT)
Dept: DERMATOLOGY | Facility: CLINIC | Age: 44
End: 2024-03-20
Payer: COMMERCIAL

## 2024-03-20 NOTE — TELEPHONE ENCOUNTER
Left Voicemail (1st Attempt) and Sent Mychart (1st Attempt) for the patient to call back and schedule the following:    Appointment type: follow up  Provider: Christina  Return date: 03/2025  Specialty phone number: [phone]  Additional appointment(s) needed: na  Additonal Notes: na

## 2024-03-22 ENCOUNTER — TELEPHONE (OUTPATIENT)
Dept: DERMATOLOGY | Facility: CLINIC | Age: 44
End: 2024-03-22
Payer: COMMERCIAL

## 2024-03-22 NOTE — TELEPHONE ENCOUNTER
Left Voicemail (2nd Attempt) for the patient to call back and schedule the following:    Appointment type: follow up  Provider: Christina  Return date: 03/2025  Specialty phone number: [phone]  Additional appointment(s) needed: na  Additonal Notes: na

## 2025-02-24 SDOH — HEALTH STABILITY: PHYSICAL HEALTH: ON AVERAGE, HOW MANY DAYS PER WEEK DO YOU ENGAGE IN MODERATE TO STRENUOUS EXERCISE (LIKE A BRISK WALK)?: 7 DAYS

## 2025-02-24 SDOH — HEALTH STABILITY: PHYSICAL HEALTH: ON AVERAGE, HOW MANY MINUTES DO YOU ENGAGE IN EXERCISE AT THIS LEVEL?: 70 MIN

## 2025-02-24 ASSESSMENT — SOCIAL DETERMINANTS OF HEALTH (SDOH): HOW OFTEN DO YOU GET TOGETHER WITH FRIENDS OR RELATIVES?: TWICE A WEEK

## 2025-02-27 ENCOUNTER — OFFICE VISIT (OUTPATIENT)
Dept: INTERNAL MEDICINE | Facility: CLINIC | Age: 45
End: 2025-02-27
Payer: COMMERCIAL

## 2025-02-27 VITALS
SYSTOLIC BLOOD PRESSURE: 137 MMHG | HEIGHT: 68 IN | HEART RATE: 95 BPM | BODY MASS INDEX: 22.17 KG/M2 | RESPIRATION RATE: 20 BRPM | OXYGEN SATURATION: 100 % | TEMPERATURE: 97.3 F | DIASTOLIC BLOOD PRESSURE: 84 MMHG | WEIGHT: 146.3 LBS

## 2025-02-27 DIAGNOSIS — Z00.00 ROUTINE GENERAL MEDICAL EXAMINATION AT A HEALTH CARE FACILITY: Primary | ICD-10-CM

## 2025-02-27 DIAGNOSIS — R61 EXCESSIVE SWEATING: ICD-10-CM

## 2025-02-27 DIAGNOSIS — L64.9 MALE PATTERN ALOPECIA: ICD-10-CM

## 2025-02-27 DIAGNOSIS — F41.9 ANXIETY: ICD-10-CM

## 2025-02-27 LAB
ALBUMIN SERPL BCG-MCNC: 4.7 G/DL (ref 3.5–5.2)
ALBUMIN UR-MCNC: NEGATIVE MG/DL
ALP SERPL-CCNC: 70 U/L (ref 40–150)
ALT SERPL W P-5'-P-CCNC: 25 U/L (ref 0–70)
ANION GAP SERPL CALCULATED.3IONS-SCNC: 15 MMOL/L (ref 7–15)
APPEARANCE UR: CLEAR
AST SERPL W P-5'-P-CCNC: 28 U/L (ref 0–45)
BACTERIA #/AREA URNS HPF: ABNORMAL /HPF
BILIRUB SERPL-MCNC: 0.8 MG/DL
BILIRUB UR QL STRIP: NEGATIVE
BUN SERPL-MCNC: 15.1 MG/DL (ref 6–20)
CALCIUM SERPL-MCNC: 10.9 MG/DL (ref 8.8–10.4)
CHLORIDE SERPL-SCNC: 98 MMOL/L (ref 98–107)
CHOLEST SERPL-MCNC: 299 MG/DL
COLOR UR AUTO: YELLOW
CREAT SERPL-MCNC: 0.84 MG/DL (ref 0.67–1.17)
EGFRCR SERPLBLD CKD-EPI 2021: >90 ML/MIN/1.73M2
ERYTHROCYTE [DISTWIDTH] IN BLOOD BY AUTOMATED COUNT: 11.6 % (ref 10–15)
EST. AVERAGE GLUCOSE BLD GHB EST-MCNC: 97 MG/DL
FASTING STATUS PATIENT QL REPORTED: YES
FASTING STATUS PATIENT QL REPORTED: YES
GLUCOSE SERPL-MCNC: 101 MG/DL (ref 70–99)
GLUCOSE UR STRIP-MCNC: NEGATIVE MG/DL
HBA1C MFR BLD: 5 % (ref 0–5.6)
HCO3 SERPL-SCNC: 25 MMOL/L (ref 22–29)
HCT VFR BLD AUTO: 45 % (ref 40–53)
HDLC SERPL-MCNC: 139 MG/DL
HGB BLD-MCNC: 15.5 G/DL (ref 13.3–17.7)
HGB UR QL STRIP: ABNORMAL
KETONES UR STRIP-MCNC: NEGATIVE MG/DL
LDLC SERPL CALC-MCNC: 150 MG/DL
LEUKOCYTE ESTERASE UR QL STRIP: NEGATIVE
MCH RBC QN AUTO: 32 PG (ref 26.5–33)
MCHC RBC AUTO-ENTMCNC: 34.4 G/DL (ref 31.5–36.5)
MCV RBC AUTO: 93 FL (ref 78–100)
NITRATE UR QL: NEGATIVE
NONHDLC SERPL-MCNC: 160 MG/DL
PH UR STRIP: 6 [PH] (ref 5–8)
PLATELET # BLD AUTO: 235 10E3/UL (ref 150–450)
POTASSIUM SERPL-SCNC: 4.4 MMOL/L (ref 3.4–5.3)
PROT SERPL-MCNC: 7.7 G/DL (ref 6.4–8.3)
RBC # BLD AUTO: 4.84 10E6/UL (ref 4.4–5.9)
RBC #/AREA URNS AUTO: ABNORMAL /HPF
SODIUM SERPL-SCNC: 138 MMOL/L (ref 135–145)
SP GR UR STRIP: <=1.005 (ref 1–1.03)
SQUAMOUS #/AREA URNS AUTO: ABNORMAL /LPF
TRIGL SERPL-MCNC: 51 MG/DL
UROBILINOGEN UR STRIP-ACNC: 0.2 E.U./DL
WBC # BLD AUTO: 5.2 10E3/UL (ref 4–11)
WBC #/AREA URNS AUTO: ABNORMAL /HPF

## 2025-02-27 ASSESSMENT — PAIN SCALES - GENERAL: PAINLEVEL_OUTOF10: NO PAIN (0)

## 2025-02-27 NOTE — PATIENT INSTRUCTIONS
Patient Education   Preventive Care Advice   This is general advice given by our system to help you stay healthy. However, your care team may have specific advice just for you. Please talk to your care team about your preventive care needs.  Nutrition  Eat 5 or more servings of fruits and vegetables each day.  Try wheat bread, brown rice and whole grain pasta (instead of white bread, rice, and pasta).  Get enough calcium and vitamin D. Check the label on foods and aim for 100% of the RDA (recommended daily allowance).  Lifestyle  Exercise at least 150 minutes each week  (30 minutes a day, 5 days a week).  Do muscle strengthening activities 2 days a week. These help control your weight and prevent disease.  No smoking.  Wear sunscreen to prevent skin cancer.  Have a dental exam and cleaning every 6 months.  Yearly exams  See your health care team every year to talk about:  Any changes in your health.  Any medicines your care team has prescribed.  Preventive care, family planning, and ways to prevent chronic diseases.  Shots (vaccines)   HPV shots (up to age 26), if you've never had them before.  Hepatitis B shots (up to age 59), if you've never had them before.  COVID-19 shot: Get this shot when it's due.  Flu shot: Get a flu shot every year.  Tetanus shot: Get a tetanus shot every 10 years.  Pneumococcal, hepatitis A, and RSV shots: Ask your care team if you need these based on your risk.  Shingles shot (for age 50 and up)  General health tests  Diabetes screening:  Starting at age 35, Get screened for diabetes at least every 3 years.  If you are younger than age 35, ask your care team if you should be screened for diabetes.  Cholesterol test: At age 39, start having a cholesterol test every 5 years, or more often if advised.  Bone density scan (DEXA): At age 50, ask your care team if you should have this scan for osteoporosis (brittle bones).  Hepatitis C: Get tested at least once in your life.  STIs (sexually  transmitted infections)  Before age 24: Ask your care team if you should be screened for STIs.  After age 24: Get screened for STIs if you're at risk. You are at risk for STIs (including HIV) if:  You are sexually active with more than one person.  You don't use condoms every time.  You or a partner was diagnosed with a sexually transmitted infection.  If you are at risk for HIV, ask about PrEP medicine to prevent HIV.  Get tested for HIV at least once in your life, whether you are at risk for HIV or not.  Cancer screening tests  Cervical cancer screening: If you have a cervix, begin getting regular cervical cancer screening tests starting at age 21.  Breast cancer scan (mammogram): If you've ever had breasts, begin having regular mammograms starting at age 40. This is a scan to check for breast cancer.  Colon cancer screening: It is important to start screening for colon cancer at age 45.  Have a colonoscopy test every 10 years (or more often if you're at risk) Or, ask your provider about stool tests like a FIT test every year or Cologuard test every 3 years.  To learn more about your testing options, visit:   .  For help making a decision, visit:   https://bit.ly/nm46519.  Prostate cancer screening test: If you have a prostate, ask your care team if a prostate cancer screening test (PSA) at age 55 is right for you.  Lung cancer screening: If you are a current or former smoker ages 50 to 80, ask your care team if ongoing lung cancer screenings are right for you.  For informational purposes only. Not to replace the advice of your health care provider. Copyright   2023 [employer]. All rights reserved. Clinically reviewed by the [facility] Transitions Program. [employer] [id number] - REV 01/24.

## 2025-02-27 NOTE — PROGRESS NOTES
Preventive Care Visit  [facility]  JOSIE POTTER MD, Internal Medicine  Feb 27, 2025      Assessment & Plan         1. Routine general medical examination at a health care facility (Primary)  Patient lives an extremely healthy and active lifestyle.  Continue same.  He is up-to-date on his immunizations.  We updated his COVID today.  I will see him back yearly.  - Comprehensive metabolic panel (BMP + Alb, Alk Phos, ALT, AST, Total. Bili, TP); Future  - Lipid panel reflex to direct LDL Fasting; Future  - CBC with platelets; Future  - UA Macroscopic with reflex to Microscopic and Culture - Lab Collect; Future  - Hemoglobin A1c; Future  - Comprehensive metabolic panel (BMP + Alb, Alk Phos, ALT, AST, Total. Bili, TP)  - Lipid panel reflex to direct LDL Fasting  - CBC with platelets  - UA Macroscopic with reflex to Microscopic and Culture - Lab Collect  - Hemoglobin A1c    2. Male pattern alopecia  He wonders about treatments for male pattern baldness.  Counseled on the options.  We discussed Propecia and the side effects involved with that.  I have recommended trial of Rogaine.    3. Excessive sweating  Continue glycopyrrolate as needed.    4. Anxiety  He gets some anxiety at times.  Especially when he is coming to doctor.  Counseled him at length today.  He is not interested in counseling or medications.  We discussed potentially trying some meditation.        Counseling  Appropriate preventive services were addressed with this patient via screening, questionnaire, or discussion as appropriate for fall prevention, nutrition, physical activity, Tobacco-use cessation, social engagement, weight loss and cognition.  Checklist reviewing preventive services available has been given to the patient.  Reviewed patient's diet, addressing concerns and/or questions.           Ashley Bermeo is a 44 year old, presenting for the following:  Physical (Pt would like to discuss Nutroful products for hair loss  - pt reports seeing on tv and would like to discuss. /No further questions or concerns at time of rooming. )        2/27/2025     7:56 AM   Additional Questions   Roomed by Christine FERNANDEZ  Jean-Claude comes today for physical.  Reports has been doing remarkably well.  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  They did an echo and a bone density.  These were normal as expected.  He wonders what he can take for some thinning hair on the top of his head.  He gets mild anxiety.  Otherwise things are going well.  Mother is doing well.   now for a few years.  Sister is doing well with 3 children.  His 2 boys are great age 6 and 4.   is doing well.  [place] to [place]oth[place].  Drinks only socially.  Runs over 20 miles a week.      Health Care Directive  Patient does not have a Health Care Directive: Patient states has Advance Directive and will bring in a copy to clinic.      2/24/2025   General Health   How would you rate your overall physical health? Excellent   Feel stress (tense, anxious, or unable to sleep) Not at all         2/24/2025   Nutrition   Three or more servings of calcium each day? Yes   Diet: Regular (no restrictions)   How many servings of fruit and vegetables per day? 4 or more   How many sweetened beverages each day? 0-1         2/24/2025   Exercise   Days per week of moderate/strenous exercise 7 days   Average minutes spent exercising at this level 70 min         2/24/2025   Social Factors   Frequency of gathering with friends or relatives Twice a week   Worry food won't last until get money to buy more No   Food not last or not have enough money for food? No   Do you have housing? (Housing is defined as stable permanent housing and does not include staying ouside in a car, in a tent, in an abandoned building, in an overnight shelter, or couch-surfing.) Yes   Are you worried about losing your housing? No  "  Lack of transportation? No   Unable to get utilities (heat,electricity)? No         2/24/2025   Dental   Dentist two times every year? Yes            Today's PHQ-2 Score:       2/26/2025    12:22 PM   PHQ-2 ( 1999 [employer])   Q1: Little interest or pleasure in doing things 0   Q2: Feeling down, depressed or hopeless 0   PHQ-2 Score 0    Q1: Little interest or pleasure in doing things Not at all   Q2: Feeling down, depressed or hopeless Not at all   PHQ-2 Score 0       Patient-reported           2/24/2025   Substance Use   Alcohol more than 3/day or more than 7/wk No   Do you use any other substances recreationally? No     Social History     Tobacco Use    Smoking status: Never    Smokeless tobacco: Never   Vaping Use    Vaping status: Never Used   Substance Use Topics    Alcohol use: Yes    Drug use: Never           2/24/2025   STI Screening   New sexual partner(s) since last STI/HIV test? No   ASCVD Risk   The ASCVD Risk score (Raquel ANG, et al., 2019) failed to calculate for the following reasons:    The valid HDL cholesterol range is 20 to 100 mg/dL        2/24/2025   Contraception/Family Planning   Questions about contraception or family planning No        Reviewed and updated as needed this visit by Provider                             Objective    Exam  /84 (BP Location: Left arm, Patient Position: Sitting, Cuff Size: Adult Regular)   Pulse 95   Temp 97.3  F (36.3  C) (Temporal)   Resp 20   Ht 1.727 m (5' 7.99\")   Wt 66.4 kg (146 lb 4.8 oz)   SpO2 100%   BMI 22.25 kg/m     Estimated body mass index is 22.25 kg/m  as calculated from the following:    Height as of this encounter: 1.727 m (5' 7.99\").    Weight as of this encounter: 66.4 kg (146 lb 4.8 oz).    Physical Exam  GENERAL: alert and no distress  EYES: Eyes grossly normal to inspection, PERRL and conjunctivae and sclerae normal  HENT: ear canals and TM's normal, nose and mouth without ulcers or lesions  NECK: no adenopathy, no " asymmetry, masses, or scars  RESP: lungs clear to auscultation - no rales, rhonchi or wheezes  CV: regular rate and rhythm, normal S1 S2, no S3 or S4, no murmur, click or rub, no peripheral edema  ABDOMEN: soft, nontender, no hepatosplenomegaly, no masses and bowel sounds normal  MS: no gross musculoskeletal defects noted, no edema  SKIN: no suspicious lesions or rashes  NEURO: Normal strength and tone, mentation intact and speech normal  PSYCH: mentation appears normal, affect normal/bright        Signed Electronically by: JOSIE POTTER MD

## 2025-04-10 ENCOUNTER — LAB (OUTPATIENT)
Dept: LAB | Facility: CLINIC | Age: 45
End: 2025-04-10
Payer: COMMERCIAL

## 2025-04-10 DIAGNOSIS — E83.52 HYPERCALCEMIA: ICD-10-CM

## 2025-04-10 LAB
CA-I BLD-MCNC: 4.9 MG/DL (ref 4.4–5.2)
PTH-INTACT SERPL-MCNC: 22 PG/ML (ref 15–65)
VIT D+METAB SERPL-MCNC: 56 NG/ML (ref 20–50)

## 2025-05-25 DIAGNOSIS — R61 EXCESSIVE SWEATING: ICD-10-CM

## 2025-05-27 RX ORDER — GLYCOPYRROLATE 1 MG/1
TABLET ORAL
Qty: 180 TABLET | Refills: 1 | Status: SHIPPED | OUTPATIENT
Start: 2025-05-27